# Patient Record
Sex: FEMALE | Race: WHITE | NOT HISPANIC OR LATINO | Employment: UNEMPLOYED | URBAN - METROPOLITAN AREA
[De-identification: names, ages, dates, MRNs, and addresses within clinical notes are randomized per-mention and may not be internally consistent; named-entity substitution may affect disease eponyms.]

---

## 2022-11-20 ENCOUNTER — APPOINTMENT (OUTPATIENT)
Dept: RADIOLOGY | Facility: MEDICAL CENTER | Age: 52
DRG: 637 | End: 2022-11-20
Attending: EMERGENCY MEDICINE
Payer: COMMERCIAL

## 2022-11-20 ENCOUNTER — APPOINTMENT (OUTPATIENT)
Dept: CARDIOLOGY | Facility: MEDICAL CENTER | Age: 52
DRG: 637 | End: 2022-11-20
Payer: COMMERCIAL

## 2022-11-20 ENCOUNTER — HOSPITAL ENCOUNTER (INPATIENT)
Facility: MEDICAL CENTER | Age: 52
LOS: 2 days | DRG: 637 | End: 2022-11-22
Attending: EMERGENCY MEDICINE | Admitting: STUDENT IN AN ORGANIZED HEALTH CARE EDUCATION/TRAINING PROGRAM
Payer: COMMERCIAL

## 2022-11-20 DIAGNOSIS — R41.82 ALTERED MENTAL STATUS, UNSPECIFIED ALTERED MENTAL STATUS TYPE: ICD-10-CM

## 2022-11-20 DIAGNOSIS — J96.01 ACUTE HYPOXEMIC RESPIRATORY FAILURE (HCC): ICD-10-CM

## 2022-11-20 DIAGNOSIS — R79.89 ELEVATED TROPONIN: ICD-10-CM

## 2022-11-20 DIAGNOSIS — R09.02 HYPOXIA: ICD-10-CM

## 2022-11-20 DIAGNOSIS — N17.9 AKI (ACUTE KIDNEY INJURY) (HCC): ICD-10-CM

## 2022-11-20 DIAGNOSIS — R00.0 TACHYCARDIA: ICD-10-CM

## 2022-11-20 PROBLEM — G93.40 ENCEPHALOPATHY: Status: ACTIVE | Noted: 2022-11-20

## 2022-11-20 PROBLEM — H54.8 LEGALLY BLIND: Chronic | Status: ACTIVE | Noted: 2022-11-20

## 2022-11-20 PROBLEM — Z79.4 TYPE 2 DIABETES MELLITUS WITH CHRONIC KIDNEY DISEASE, WITH LONG-TERM CURRENT USE OF INSULIN (HCC): Chronic | Status: ACTIVE | Noted: 2022-11-20

## 2022-11-20 PROBLEM — R74.01 TRANSAMINITIS: Status: ACTIVE | Noted: 2022-11-20

## 2022-11-20 PROBLEM — N18.9 CKD (CHRONIC KIDNEY DISEASE): Status: ACTIVE | Noted: 2022-11-20

## 2022-11-20 PROBLEM — E10.9 TYPE 1 DIABETES MELLITUS (HCC): Chronic | Status: ACTIVE | Noted: 2022-11-20

## 2022-11-20 PROBLEM — E11.22 TYPE 2 DIABETES MELLITUS WITH CHRONIC KIDNEY DISEASE, WITH LONG-TERM CURRENT USE OF INSULIN (HCC): Chronic | Status: ACTIVE | Noted: 2022-11-20

## 2022-11-20 PROBLEM — J96.00 ACUTE RESPIRATORY FAILURE (HCC): Status: ACTIVE | Noted: 2022-11-20

## 2022-11-20 PROBLEM — N18.9 CKD (CHRONIC KIDNEY DISEASE): Chronic | Status: ACTIVE | Noted: 2022-11-20

## 2022-11-20 LAB
ALBUMIN SERPL BCP-MCNC: 4.3 G/DL (ref 3.2–4.9)
ALBUMIN/GLOB SERPL: 1 G/DL
ALP SERPL-CCNC: 150 U/L (ref 30–99)
ALT SERPL-CCNC: 67 U/L (ref 2–50)
AMPHET UR QL SCN: NEGATIVE
ANION GAP SERPL CALC-SCNC: 12 MMOL/L (ref 7–16)
APPEARANCE UR: CLEAR
AST SERPL-CCNC: 95 U/L (ref 12–45)
B-OH-BUTYR SERPL-MCNC: 0.4 MMOL/L (ref 0.02–0.27)
BARBITURATES UR QL SCN: POSITIVE
BASE EXCESS BLDA CALC-SCNC: 0 MMOL/L (ref -4–3)
BASOPHILS # BLD AUTO: 0.5 % (ref 0–1.8)
BASOPHILS # BLD: 0.07 K/UL (ref 0–0.12)
BENZODIAZ UR QL SCN: NEGATIVE
BILIRUB SERPL-MCNC: 0.4 MG/DL (ref 0.1–1.5)
BILIRUB UR QL STRIP.AUTO: NEGATIVE
BODY TEMPERATURE: 37.1 CENTIGRADE
BUN SERPL-MCNC: 30 MG/DL (ref 8–22)
BZE UR QL SCN: NEGATIVE
CALCIUM SERPL-MCNC: 9.4 MG/DL (ref 8.5–10.5)
CANNABINOIDS UR QL SCN: NEGATIVE
CHLORIDE SERPL-SCNC: 94 MMOL/L (ref 96–112)
CK SERPL-CCNC: 184 U/L (ref 0–154)
CO2 SERPL-SCNC: 27 MMOL/L (ref 20–33)
COLOR UR: YELLOW
CREAT SERPL-MCNC: 1.55 MG/DL (ref 0.5–1.4)
D DIMER PPP IA.FEU-MCNC: 0.32 UG/ML (FEU) (ref 0–0.5)
EKG IMPRESSION: NORMAL
EOSINOPHIL # BLD AUTO: 0.01 K/UL (ref 0–0.51)
EOSINOPHIL NFR BLD: 0.1 % (ref 0–6.9)
ERYTHROCYTE [DISTWIDTH] IN BLOOD BY AUTOMATED COUNT: 43.2 FL (ref 35.9–50)
FLUAV RNA SPEC QL NAA+PROBE: NEGATIVE
FLUBV RNA SPEC QL NAA+PROBE: NEGATIVE
GFR SERPLBLD CREATININE-BSD FMLA CKD-EPI: 39 ML/MIN/1.73 M 2
GLOBULIN SER CALC-MCNC: 4.4 G/DL (ref 1.9–3.5)
GLUCOSE BLD STRIP.AUTO-MCNC: 192 MG/DL (ref 65–99)
GLUCOSE BLD STRIP.AUTO-MCNC: 244 MG/DL (ref 65–99)
GLUCOSE BLD STRIP.AUTO-MCNC: 265 MG/DL (ref 65–99)
GLUCOSE BLD STRIP.AUTO-MCNC: 334 MG/DL (ref 65–99)
GLUCOSE SERPL-MCNC: 403 MG/DL (ref 65–99)
GLUCOSE UR STRIP.AUTO-MCNC: >=1000 MG/DL
HCO3 BLDA-SCNC: 25 MMOL/L (ref 17–25)
HCT VFR BLD AUTO: 42.3 % (ref 37–47)
HGB BLD-MCNC: 13.6 G/DL (ref 12–16)
IMM GRANULOCYTES # BLD AUTO: 0.07 K/UL (ref 0–0.11)
IMM GRANULOCYTES NFR BLD AUTO: 0.5 % (ref 0–0.9)
INHALED O2 FLOW RATE: 2 L/MIN (ref 2–10)
KETONES UR STRIP.AUTO-MCNC: ABNORMAL MG/DL
LACTATE SERPL-SCNC: 1.2 MMOL/L (ref 0.5–2)
LACTATE SERPL-SCNC: 1.5 MMOL/L (ref 0.5–2)
LACTATE SERPL-SCNC: 1.8 MMOL/L (ref 0.5–2)
LACTATE SERPL-SCNC: 2.3 MMOL/L (ref 0.5–2)
LEUKOCYTE ESTERASE UR QL STRIP.AUTO: NEGATIVE
LV EJECT FRACT  99904: 55
LYMPHOCYTES # BLD AUTO: 0.82 K/UL (ref 1–4.8)
LYMPHOCYTES NFR BLD: 5.6 % (ref 22–41)
MAGNESIUM SERPL-MCNC: 2 MG/DL (ref 1.5–2.5)
MCH RBC QN AUTO: 29.1 PG (ref 27–33)
MCHC RBC AUTO-ENTMCNC: 32.2 G/DL (ref 33.6–35)
MCV RBC AUTO: 90.4 FL (ref 81.4–97.8)
METHADONE UR QL SCN: NEGATIVE
MICRO URNS: ABNORMAL
MONOCYTES # BLD AUTO: 0.48 K/UL (ref 0–0.85)
MONOCYTES NFR BLD AUTO: 3.3 % (ref 0–13.4)
NEUTROPHILS # BLD AUTO: 13.16 K/UL (ref 2–7.15)
NEUTROPHILS NFR BLD: 90 % (ref 44–72)
NITRITE UR QL STRIP.AUTO: NEGATIVE
NRBC # BLD AUTO: 0 K/UL
NRBC BLD-RTO: 0 /100 WBC
NT-PROBNP SERPL IA-MCNC: 341 PG/ML (ref 0–125)
OPIATES UR QL SCN: NEGATIVE
OXYCODONE UR QL SCN: NEGATIVE
PCO2 BLDA: 40.3 MMHG (ref 26–37)
PCO2 TEMP ADJ BLDA: 40.5 MMHG (ref 26–37)
PCP UR QL SCN: NEGATIVE
PH BLDA: 7.4 [PH] (ref 7.4–7.5)
PH TEMP ADJ BLDA: 7.4 [PH] (ref 7.4–7.5)
PH UR STRIP.AUTO: 5 [PH] (ref 5–8)
PHOSPHATE SERPL-MCNC: 2.8 MG/DL (ref 2.5–4.5)
PLATELET # BLD AUTO: 332 K/UL (ref 164–446)
PMV BLD AUTO: 9.2 FL (ref 9–12.9)
PO2 BLDA: 96 MMHG (ref 64–87)
PO2 TEMP ADJ BLDA: 96.6 MMHG (ref 64–87)
POTASSIUM SERPL-SCNC: 5.3 MMOL/L (ref 3.6–5.5)
PROCALCITONIN SERPL-MCNC: 0.23 NG/ML
PROCALCITONIN SERPL-MCNC: 0.41 NG/ML
PROPOXYPH UR QL SCN: NEGATIVE
PROT SERPL-MCNC: 8.7 G/DL (ref 6–8.2)
PROT UR QL STRIP: NEGATIVE MG/DL
RBC # BLD AUTO: 4.68 M/UL (ref 4.2–5.4)
RBC UR QL AUTO: NEGATIVE
RSV RNA SPEC QL NAA+PROBE: NEGATIVE
SAO2 % BLDA: 96.8 % (ref 93–99)
SARS-COV-2 RNA RESP QL NAA+PROBE: NOTDETECTED
SODIUM SERPL-SCNC: 133 MMOL/L (ref 135–145)
SP GR UR STRIP.AUTO: 1.01
SPECIMEN SOURCE: NORMAL
TROPONIN T SERPL-MCNC: 48 NG/L (ref 6–19)
TROPONIN T SERPL-MCNC: 52 NG/L (ref 6–19)
TROPONIN T SERPL-MCNC: 57 NG/L (ref 6–19)
TROPONIN T SERPL-MCNC: 64 NG/L (ref 6–19)
UROBILINOGEN UR STRIP.AUTO-MCNC: 0.2 MG/DL
WBC # BLD AUTO: 14.6 K/UL (ref 4.8–10.8)

## 2022-11-20 PROCEDURE — 85379 FIBRIN DEGRADATION QUANT: CPT

## 2022-11-20 PROCEDURE — 80053 COMPREHEN METABOLIC PANEL: CPT

## 2022-11-20 PROCEDURE — A9270 NON-COVERED ITEM OR SERVICE: HCPCS | Performed by: EMERGENCY MEDICINE

## 2022-11-20 PROCEDURE — 71045 X-RAY EXAM CHEST 1 VIEW: CPT

## 2022-11-20 PROCEDURE — 51798 US URINE CAPACITY MEASURE: CPT

## 2022-11-20 PROCEDURE — 99223 1ST HOSP IP/OBS HIGH 75: CPT | Mod: AI | Performed by: STUDENT IN AN ORGANIZED HEALTH CARE EDUCATION/TRAINING PROGRAM

## 2022-11-20 PROCEDURE — C9803 HOPD COVID-19 SPEC COLLECT: HCPCS

## 2022-11-20 PROCEDURE — 36415 COLL VENOUS BLD VENIPUNCTURE: CPT

## 2022-11-20 PROCEDURE — 93306 TTE W/DOPPLER COMPLETE: CPT

## 2022-11-20 PROCEDURE — 84484 ASSAY OF TROPONIN QUANT: CPT | Mod: 91

## 2022-11-20 PROCEDURE — 94760 N-INVAS EAR/PLS OXIMETRY 1: CPT

## 2022-11-20 PROCEDURE — 85025 COMPLETE CBC W/AUTO DIFF WBC: CPT

## 2022-11-20 PROCEDURE — 83880 ASSAY OF NATRIURETIC PEPTIDE: CPT

## 2022-11-20 PROCEDURE — 83605 ASSAY OF LACTIC ACID: CPT | Mod: 91

## 2022-11-20 PROCEDURE — 93005 ELECTROCARDIOGRAM TRACING: CPT

## 2022-11-20 PROCEDURE — 770020 HCHG ROOM/CARE - TELE (206)

## 2022-11-20 PROCEDURE — 700105 HCHG RX REV CODE 258: Performed by: EMERGENCY MEDICINE

## 2022-11-20 PROCEDURE — 700102 HCHG RX REV CODE 250 W/ 637 OVERRIDE(OP)

## 2022-11-20 PROCEDURE — 83735 ASSAY OF MAGNESIUM: CPT

## 2022-11-20 PROCEDURE — 82803 BLOOD GASES ANY COMBINATION: CPT

## 2022-11-20 PROCEDURE — 700105 HCHG RX REV CODE 258: Performed by: INTERNAL MEDICINE

## 2022-11-20 PROCEDURE — 700117 HCHG RX CONTRAST REV CODE 255

## 2022-11-20 PROCEDURE — 82550 ASSAY OF CK (CPK): CPT

## 2022-11-20 PROCEDURE — 99285 EMERGENCY DEPT VISIT HI MDM: CPT

## 2022-11-20 PROCEDURE — 82962 GLUCOSE BLOOD TEST: CPT | Mod: 91

## 2022-11-20 PROCEDURE — 84100 ASSAY OF PHOSPHORUS: CPT

## 2022-11-20 PROCEDURE — 82010 KETONE BODYS QUAN: CPT

## 2022-11-20 PROCEDURE — 81003 URINALYSIS AUTO W/O SCOPE: CPT

## 2022-11-20 PROCEDURE — 700102 HCHG RX REV CODE 250 W/ 637 OVERRIDE(OP): Performed by: EMERGENCY MEDICINE

## 2022-11-20 PROCEDURE — 93306 TTE W/DOPPLER COMPLETE: CPT | Mod: 26 | Performed by: INTERNAL MEDICINE

## 2022-11-20 PROCEDURE — A9270 NON-COVERED ITEM OR SERVICE: HCPCS

## 2022-11-20 PROCEDURE — 0241U HCHG SARS-COV-2 COVID-19 NFCT DS RESP RNA 4 TRGT MIC: CPT

## 2022-11-20 PROCEDURE — 700111 HCHG RX REV CODE 636 W/ 250 OVERRIDE (IP)

## 2022-11-20 PROCEDURE — 93005 ELECTROCARDIOGRAM TRACING: CPT | Performed by: EMERGENCY MEDICINE

## 2022-11-20 PROCEDURE — 96372 THER/PROPH/DIAG INJ SC/IM: CPT

## 2022-11-20 PROCEDURE — 84145 PROCALCITONIN (PCT): CPT | Mod: 91

## 2022-11-20 PROCEDURE — 80307 DRUG TEST PRSMV CHEM ANLYZR: CPT

## 2022-11-20 RX ORDER — SODIUM CHLORIDE, SODIUM LACTATE, POTASSIUM CHLORIDE, CALCIUM CHLORIDE 600; 310; 30; 20 MG/100ML; MG/100ML; MG/100ML; MG/100ML
1000 INJECTION, SOLUTION INTRAVENOUS ONCE
Status: COMPLETED | OUTPATIENT
Start: 2022-11-20 | End: 2022-11-20

## 2022-11-20 RX ORDER — PANTOPRAZOLE SODIUM 40 MG/1
40 TABLET, DELAYED RELEASE ORAL DAILY
Status: DISCONTINUED | OUTPATIENT
Start: 2022-11-20 | End: 2022-11-20

## 2022-11-20 RX ORDER — POLYETHYLENE GLYCOL 3350 17 G/17G
1 POWDER, FOR SOLUTION ORAL
Status: DISCONTINUED | OUTPATIENT
Start: 2022-11-20 | End: 2022-11-22 | Stop reason: HOSPADM

## 2022-11-20 RX ORDER — ACETAMINOPHEN 325 MG/1
650 TABLET ORAL EVERY 6 HOURS PRN
Status: DISCONTINUED | OUTPATIENT
Start: 2022-11-20 | End: 2022-11-22 | Stop reason: HOSPADM

## 2022-11-20 RX ORDER — OMEPRAZOLE 20 MG/1
20 CAPSULE, DELAYED RELEASE ORAL DAILY
Status: DISCONTINUED | OUTPATIENT
Start: 2022-11-20 | End: 2022-11-22 | Stop reason: HOSPADM

## 2022-11-20 RX ORDER — INSULIN ASPART 100 [IU]/ML
INJECTION, SOLUTION INTRAVENOUS; SUBCUTANEOUS
COMMUNITY

## 2022-11-20 RX ORDER — INSULIN GLARGINE 100 [IU]/ML
24 INJECTION, SOLUTION SUBCUTANEOUS EVERY MORNING
COMMUNITY

## 2022-11-20 RX ORDER — IPRATROPIUM BROMIDE AND ALBUTEROL SULFATE 2.5; .5 MG/3ML; MG/3ML
3 SOLUTION RESPIRATORY (INHALATION)
Status: DISCONTINUED | OUTPATIENT
Start: 2022-11-20 | End: 2022-11-22 | Stop reason: HOSPADM

## 2022-11-20 RX ORDER — BISACODYL 10 MG
10 SUPPOSITORY, RECTAL RECTAL
Status: DISCONTINUED | OUTPATIENT
Start: 2022-11-20 | End: 2022-11-22 | Stop reason: HOSPADM

## 2022-11-20 RX ORDER — AMOXICILLIN 250 MG
2 CAPSULE ORAL 2 TIMES DAILY
Status: DISCONTINUED | OUTPATIENT
Start: 2022-11-20 | End: 2022-11-22 | Stop reason: HOSPADM

## 2022-11-20 RX ORDER — GABAPENTIN 600 MG/1
1 TABLET ORAL 2 TIMES DAILY
Status: ON HOLD | COMMUNITY
Start: 2022-09-08 | End: 2022-11-22 | Stop reason: SDUPTHER

## 2022-11-20 RX ORDER — GABAPENTIN 300 MG/1
600 CAPSULE ORAL 2 TIMES DAILY
Status: DISCONTINUED | OUTPATIENT
Start: 2022-11-20 | End: 2022-11-22 | Stop reason: HOSPADM

## 2022-11-20 RX ORDER — SODIUM CHLORIDE 9 MG/ML
INJECTION, SOLUTION INTRAVENOUS CONTINUOUS
Status: ACTIVE | OUTPATIENT
Start: 2022-11-20 | End: 2022-11-21

## 2022-11-20 RX ORDER — PANTOPRAZOLE SODIUM 40 MG/1
40 TABLET, DELAYED RELEASE ORAL DAILY
COMMUNITY
Start: 2022-08-16

## 2022-11-20 RX ORDER — ASPIRIN 325 MG
325 TABLET ORAL ONCE
Status: COMPLETED | OUTPATIENT
Start: 2022-11-20 | End: 2022-11-20

## 2022-11-20 RX ORDER — HEPARIN SODIUM 5000 [USP'U]/ML
5000 INJECTION, SOLUTION INTRAVENOUS; SUBCUTANEOUS EVERY 8 HOURS
Status: DISCONTINUED | OUTPATIENT
Start: 2022-11-20 | End: 2022-11-22 | Stop reason: HOSPADM

## 2022-11-20 RX ADMIN — SODIUM CHLORIDE, POTASSIUM CHLORIDE, SODIUM LACTATE AND CALCIUM CHLORIDE 1000 ML: 600; 310; 30; 20 INJECTION, SOLUTION INTRAVENOUS at 05:18

## 2022-11-20 RX ADMIN — OMEPRAZOLE 20 MG: 20 CAPSULE, DELAYED RELEASE ORAL at 08:27

## 2022-11-20 RX ADMIN — SODIUM CHLORIDE: 9 INJECTION, SOLUTION INTRAVENOUS at 23:10

## 2022-11-20 RX ADMIN — INSULIN HUMAN 2 UNITS: 100 INJECTION, SOLUTION PARENTERAL at 17:19

## 2022-11-20 RX ADMIN — INSULIN HUMAN 1 UNITS: 100 INJECTION, SOLUTION PARENTERAL at 21:46

## 2022-11-20 RX ADMIN — INSULIN GLARGINE-YFGN 15 UNITS: 100 INJECTION, SOLUTION SUBCUTANEOUS at 10:11

## 2022-11-20 RX ADMIN — SODIUM CHLORIDE: 9 INJECTION, SOLUTION INTRAVENOUS at 08:59

## 2022-11-20 RX ADMIN — HEPARIN SODIUM 5000 UNITS: 5000 INJECTION, SOLUTION INTRAVENOUS; SUBCUTANEOUS at 08:26

## 2022-11-20 RX ADMIN — ASPIRIN 325 MG: 325 TABLET ORAL at 04:43

## 2022-11-20 RX ADMIN — HEPARIN SODIUM 5000 UNITS: 5000 INJECTION, SOLUTION INTRAVENOUS; SUBCUTANEOUS at 16:15

## 2022-11-20 RX ADMIN — HUMAN ALBUMIN MICROSPHERES AND PERFLUTREN 3 ML: 10; .22 INJECTION, SOLUTION INTRAVENOUS at 08:15

## 2022-11-20 RX ADMIN — HEPARIN SODIUM 5000 UNITS: 5000 INJECTION, SOLUTION INTRAVENOUS; SUBCUTANEOUS at 23:00

## 2022-11-20 RX ADMIN — ACETAMINOPHEN 650 MG: 325 TABLET, FILM COATED ORAL at 08:27

## 2022-11-20 RX ADMIN — INSULIN HUMAN 3 UNITS: 100 INJECTION, SOLUTION PARENTERAL at 10:10

## 2022-11-20 ASSESSMENT — COGNITIVE AND FUNCTIONAL STATUS - GENERAL
MOVING TO AND FROM BED TO CHAIR: A LITTLE
STANDING UP FROM CHAIR USING ARMS: A LITTLE
WALKING IN HOSPITAL ROOM: A LOT
MOBILITY SCORE: 20
HELP NEEDED FOR BATHING: A LITTLE
DAILY ACTIVITIY SCORE: 23
SUGGESTED CMS G CODE MODIFIER MOBILITY: CJ
SUGGESTED CMS G CODE MODIFIER DAILY ACTIVITY: CI

## 2022-11-20 ASSESSMENT — PATIENT HEALTH QUESTIONNAIRE - PHQ9
1. LITTLE INTEREST OR PLEASURE IN DOING THINGS: NOT AT ALL
SUM OF ALL RESPONSES TO PHQ9 QUESTIONS 1 AND 2: 0
SUM OF ALL RESPONSES TO PHQ9 QUESTIONS 1 AND 2: 0
2. FEELING DOWN, DEPRESSED, IRRITABLE, OR HOPELESS: NOT AT ALL
1. LITTLE INTEREST OR PLEASURE IN DOING THINGS: NOT AT ALL
2. FEELING DOWN, DEPRESSED, IRRITABLE, OR HOPELESS: NOT AT ALL

## 2022-11-20 ASSESSMENT — LIFESTYLE VARIABLES
ALCOHOL_USE: NO
TOTAL SCORE: 0
TOTAL SCORE: 0
EVER HAD A DRINK FIRST THING IN THE MORNING TO STEADY YOUR NERVES TO GET RID OF A HANGOVER: NO
EVER FELT BAD OR GUILTY ABOUT YOUR DRINKING: NO
CONSUMPTION TOTAL: NEGATIVE
ON A TYPICAL DAY WHEN YOU DRINK ALCOHOL HOW MANY DRINKS DO YOU HAVE: 0
DOES PATIENT WANT TO STOP DRINKING: NO
HOW MANY TIMES IN THE PAST YEAR HAVE YOU HAD 5 OR MORE DRINKS IN A DAY: 0
HAVE PEOPLE ANNOYED YOU BY CRITICIZING YOUR DRINKING: NO
HAVE YOU EVER FELT YOU SHOULD CUT DOWN ON YOUR DRINKING: NO
TOTAL SCORE: 0
AVERAGE NUMBER OF DAYS PER WEEK YOU HAVE A DRINK CONTAINING ALCOHOL: 0
SUBSTANCE_ABUSE: 0

## 2022-11-20 ASSESSMENT — ENCOUNTER SYMPTOMS
CHILLS: 0
CLAUDICATION: 0
NAUSEA: 0
EYE PAIN: 0
BACK PAIN: 0
PALPITATIONS: 0
SPUTUM PRODUCTION: 0
SHORTNESS OF BREATH: 0
DIZZINESS: 0
COUGH: 0
MYALGIAS: 1
VOMITING: 0
SINUS PAIN: 0
PHOTOPHOBIA: 0
SORE THROAT: 0
MYALGIAS: 0
WEAKNESS: 0
HEADACHES: 0
BRUISES/BLEEDS EASILY: 0
EYE DISCHARGE: 0
FOCAL WEAKNESS: 0
ABDOMINAL PAIN: 0
WHEEZING: 0
BLOOD IN STOOL: 0
FEVER: 0
HALLUCINATIONS: 0
NECK PAIN: 0
DEPRESSION: 0

## 2022-11-20 ASSESSMENT — FIBROSIS 4 INDEX: FIB4 SCORE: 1.89

## 2022-11-20 NOTE — ASSESSMENT & PLAN NOTE
ALT 67, AST 95, alk phos 150 on admission  No abdominal pain, nausea, or vomiting  -Avoid hepatoxins, serial monitor liver and synthetic function   -Monitor for hepatic encephalopathy

## 2022-11-20 NOTE — ED PROVIDER NOTES
"ED Provider Note    Scribed for Ron Rivers II, M* by Orestes Deluca. 11/20/2022  2:11 AM    Means of Arrival: EMS [facility]  History obtained by: Patient/EMS  Limitations: None noted    CHIEF COMPLAINT  Chief Complaint   Patient presents with    ALOC       HPI  Brenda Mckeon is a 51 y.o. female with a history of type 1 diabetes, a heart murmur, and legal blindness who presents to the Emergency Department via EMS for evaluation of altered level of consciousness onset earlier today. Per  EMS, the patient was a passenger in a car driving through [facility] when her family noted she was acting abnormally. She lives in [place], and notes that she was driving to [place] for a family reunion starting yesterday morning. She states she also has left leg cramping pain, and increased drowsiness, but denies any vomiting or chest pain.  Per EMS her blood glucose on scene was 407, but was 334 in the Emergency Department. She was reportedly saturating at 80% on room air, and was placed on oxygen en route. She has a history of open heart surgery in 1993 for placement of \"a special patch\" due to chronically low oxygen saturation of 80%. She does not typically use supplemental oxygen. She denies any history of blood clots, asthma, CHF, or COPD, and notes that she does not take any anticoagulating medications. Her daily medications include an unnamed diuretic and insulin. She is legally blind due to complications of diabetes. She has no chest pain on inspiration.     REVIEW OF SYSTEMS  Review of Systems   Constitutional:  Positive for malaise/fatigue.   Cardiovascular:  Negative for chest pain.   Gastrointestinal:  Negative for vomiting.   Musculoskeletal:  Positive for myalgias.   All other systems reviewed and are negative.  See HPI for further details.    PAST MEDICAL HISTORY   has a past medical history of Diabetes ([facility]), Heart murmur, and Renal disorder.Diabetes  Blind 2/2 diabetes complications  Neuropathy    SOCIAL " "HISTORY  Social History     Tobacco Use    Smoking status: Not noted      SURGICAL HISTORY   has a past surgical history that includes other abdominal surgery and other cardiac surgery.    CURRENT MEDICATIONS  Home Medications       Reviewed by Goivanny Trammell (Pharmacy Tech) on 11/20/22 at 0411  Med List Status: Complete     Medication Last Dose Status   Calcium Carbonate Antacid (ANTACID PO) 11/19/2022 Active   GABAPENTIN PO 11/19/2022 Active   insulin aspart (NOVOLOG) 100 UNIT/ML Solution 11/19/2022 Active   insulin glargine 100 UNIT/ML SC SOLN 11/18/2022 Active                    ALLERGIES  Allergies   Allergen Reactions    Humalog [Insulin Lispro, Human] Unspecified     Unknown    Valium Unspecified     Unknown       PHYSICAL EXAM  VITAL SIGNS: Pulse (!) (P) 110   Temp (P) 36.4 °C (97.6 °F) (Tympanic)   Resp (P) 18   Ht (P) 1.626 m (5' 4\")   Wt (P) 72.6 kg (160 lb)   SpO2 (P) 98%   BMI (P) 27.46 kg/m²     Pulse ox interpretation: I interpret this pulse ox as abnormal.  Constitutional: Alert in no apparent distress. Pleasant 51 year old female laying in bed.  HENT: No signs of trauma, Bilateral external ears normal, Nose normal.   Eyes: Pupils are equal, Conjunctiva normal, Non-icteric. Patient is blind.  Neck: Normal range of motion, No tenderness, Supple, No stridor.   Cardiovascular: Regular rate and rhythm, no murmurs. Symmetric distal pulses. No cyanosis of extremities. Strong pedal pulses noted to left leg. Trace pitting edema to the left lower extremity.  Thorax & Lungs: Normal breath sounds, No respiratory distress, No wheezing, No chest tenderness.   Abdomen: Soft, No tenderness, No masses, No pulsatile masses. No peritoneal signs.  Skin: Warm, Dry, No erythema, No rash.   Back: No midline bony tenderness, No CVA tenderness.   Musculoskeletal: Good range of motion in all major joints. No tenderness to palpation or major deformities noted.   Neurologic: Alert , Normal motor function, Normal " sensory function, No focal deficits noted. Mentation is good, alert and oriented to person time place and situation. No facial droop, Moves all extremities. No unilateral weakness or deficits.  Psychiatric: Affect normal, Judgment normal, Mood normal.     DIAGNOSTIC STUDIES / PROCEDURES    EKG Interpretation:  Results for orders placed or performed during the hospital encounter of 22   EKG   Result Value Ref Range    Report       [facility] Emergency Dept.    Test Date:  2022  Pt Name:    JANELLE SMITH                   Department: ER  MRN:        [id number]                      Room:       RD 10  Gender:     F                            Technician: [id number]  :        1970                   Requested By:ER TRIAGE PROTOCOL  Order #:    [id number]                    Reading MD: Ron Rivers II, MD    Measurements  Intervals                                Axis  Rate:       111                          P:          0  AR:         144                          QRS:        88  QRSD:       127                          T:          -79  QT:         384  QTc:        522    Interpretive Statements  Sinus tachycardia  Right atrial enlargement  Right bundle branch block  Twave inversions in lateral and septal leads. No ST elevation.  Impression: SInus tachycardia with RBBB and lateral twave inversions,  possible  ischemia  No previous ECG available for comparison  Electronically Signed On  4:24:00 PST by Ron Rivers II, MD        LABS  Pertinent Labs & Imaging studies reviewed. (See chart for details)    RADIOLOGY  DX-CHEST-PORTABLE (1 VIEW)   Final Result      Mild cardiac silhouette enlargement following sternotomy      EC-ECHOCARDIOGRAM COMPLETE W/O CONT    (Results Pending)     Pertinent Labs & Imaging studies reviewed. (See chart for details)    COURSE & MEDICAL DECISION MAKING  Pertinent Labs & Imaging studies reviewed. (See chart for details)    2:11 AM This is a 51 y.o.  female with type 1 diabetes who presents with altered level of consciousness(no longer altered), hypoxia, and hyperglycemia and the differential diagnosis includes but is not limited to DKA, DVT, PE, MI, Renal failure, or Pneumonia. Ordered for DX-Chest, D-Dimer, Beta-Hydroxybutyric acid, CBC w/diff, CMP, Lactic Acid, Troponin, proBNP, UA, and EKG to evaluate.    I have ordered continuous pulse ox and cardiac monitoring. There are abnormalities. Pulse ox shows a normal waveform with saturations of 85% on room air. Cardiac monitors show a heart rate of 110 with a sinus rhythm.      4:24 AM - The patient will be medicated with Aspirin 325 mg PO.    4:39 AM - Paged Hospitalist. Ordered Troponin.    5:01 AM - Patient was reevaluated at bedside. Patient's daughter and caregiver at bedside. Discussed lab and radiology results with the patient and daughter and informed her that she has a low eGFR and elevated troponin. She spoke with a nephrologist in May of this year due to chronic kidney disease with a baseline creatinine of 1 with fluctuations based on volume status. Her creatinine was 0.98 on 4/22/22. She has not needed any follow up since. She was hospitalized in June of this year. Patient's daughter indicates that the patient felt near-syncopal and was having night terrors in her sleep, and was unable to be roused. She regularly takes gabapentin, an acid reflux medication, and insulin daily. Ordered for incentive spirometry to evaluate continued hypoxia as well as an ABG. Patient indicates she does not want to stay in the hospital. Explained that my main concern is prolonged hypoxia, and we would like to see the results after doing incentive spirometry.    5:10 AM - The patient will be medicated with LR bolus for her increased creatinine of 1.55 compare to baseline of 0.8 and hyperglycemia.  Ordered for Arterial Blood Gas w/ O2.    5:19 AM - I discussed the patient's case and the above findings with Niya Celestin  ([facility] Hospitalist) who agrees to evaluate the patient for hospitalization.     5:26 AM - Patient was reevaluated at bedside. Explained the need to obtain an ABG (to see what o2 level actually is, rule out pulse oximeter error) and the plan to have her discuss further treatment with a Hospitalist. Patient verbalizes understanding and agreement to this plan of care.      6:14 AM  - I discussed the patient's case and the above findings with Dr. Ford (Hospitalist) who notes that the patient is refusing admission at this time.        DISPOSITION:  Patient will be hospitalized by Niya Celestin ([facility] Hospitalist) in guarded condition.     FINAL IMPRESSION  1. Altered mental status, unspecified altered mental status type    2. Hypoxia    3. Tachycardia    4. RODY (acute kidney injury) (HCC)    5. Elevated troponin          I, Orestes Deluca (Scribe), am scribing for, and in the presence of, ESME Kenny II.    Electronically signed by: Orestes Deluca (Scribe), 11/20/2022    IRon II, M* personally performed the services described in this documentation, as scribed by Orestes Deluca in my presence, and it is both accurate and complete.    The note accurately reflects work and decisions made by me.  Ron Rivers II, M.D.  11/20/2022  7:40 AM

## 2022-11-20 NOTE — DISCHARGE PLANNING
RNCM notified that pt belongings were left on [facility]; RNCM PC to inquire. [facility] to drop off belongings.

## 2022-11-20 NOTE — ED NOTES
Pt resting comfortably in bed at this time. On vitals monitor. Respirations equal and unlabored. Vitals signs stable. No acute distress at this time. Call light within reach.

## 2022-11-20 NOTE — ASSESSMENT & PLAN NOTE
Troponin 52 and 57 on admission  Patient denies chest pain  Suspect secondary to demand due to hypoxia  EKG on admission demonstrates sinus tachycardia, rate 111, with right bundle branch block and T wave inversion in lateral leads  -Aspirin 324x1  -Continue to trend troponin every 6 hours  -Cardiac echo ordered   -Telemetry monitoring

## 2022-11-20 NOTE — ED NOTES
Rounded on pt. Pt resting comfortably in bed at this time. On vitals monitor. Respirations equal and unlabored. Vitals signs stable. No acute distress at this time. Call light within reach.

## 2022-11-20 NOTE — ED NOTES
"Pt hesitant about being admitted to hospital. States \"My family is in [place], and I cannot stay here\". Hospitalist at bedside with myself and ARACELI Mcclellan. Risks and benefits explained to the patient. Explained to patient that she is requiring oxygen to maintain good saturation levels, and without it, or identifying the cause for the low oxygen, she would rapidly deteriorate. Patient remains addiment about not being admitted. Daughter at bedside, expressed frustration about not receiving answers for what is going on. Hospitalist explained to patient and daughter the processes of the hospital, and all we have investigated with the patient.   "

## 2022-11-20 NOTE — ED NOTES
Patient transported with ACLS tech on tele monitor. All patient belongings, meds, and chart sent with patient. Patient care transferred.

## 2022-11-20 NOTE — ED NOTES
Medicated patient per MAR. Patient denies further needs. Call light within reach. Family at bedside.

## 2022-11-20 NOTE — ED TRIAGE NOTES
Chief Complaint   Patient presents with   • ALOC     Pt [facility]. Pt was driving with family, when they noticed she became altered and drowsy. Pt ANOx4 to R10. Changed into hospital gown and on monitor at this time. Glucose of 407 by EMS

## 2022-11-20 NOTE — ED NOTES
Patient agreeable with POC at this time. Echo at bedside. Phlebotomy requested to draw blood cultures.

## 2022-11-20 NOTE — ASSESSMENT & PLAN NOTE
Secondary to uncontrolled diabetes  Patient reports baseline creatinine is approximately 1  -Avoid nephrotoxic drugs

## 2022-11-20 NOTE — H&P
Hospital Medicine History & Physical Note    Date of Service  11/20/2022    Primary Care Physician  Pcp Not In Computer    Consultants  None    Code Status  DNR/DNI (confirmed on admission)    Chief Complaint  Chief Complaint   Patient presents with    ALOC       History of Presenting Illness  Brenda Mckeon is a 52 y.o. female with uncontrolled type 2 diabetes, CKD3a, legally blind 2/2 uncontrolled DM2, and obesity, s/p cholecystectomy, s/p open heart surgery as a child, who presented the hospital on 11/20/2022 via life flight from [place], [place] with altered mental status and hypoxia.    History was obtained from the patient and the patient's daughter.  Patient's daughter reports that they were on a long road trip from [place] to [place] when the patient became unresponsive in the backseat of the car prompting family to call 911.  Upon arrival of EMS, the patient was found to be hypoxic with SPO2 of 65% on room air.  The patient was transported via [facility] to [facility].  Patient denies chest pain, shortness of breath, cough, fever, palpitations, nausea or vomiting, or dysuria.  She denies recent sick contacts or recent illness.  She states that she was in her normal state of health prior to being transported to the hospital.     Work-up in the ER was significant for blood glucose of 400 with normal anion gap, bicarb of 27, and mildly elevated beta hydroxybutyrate of 0.4.  Other pertinent labs include leukocytosis of 14.6, BUN of 38 and creatinine of 1.55, AST 95, ALT 67, elevated alk phos of 150 and mildly elevated lactic acid 2.3.  Troponin elevated at 52 and 57.  D-dimer negative.  Urinalysis with elevated glucose and trace ketones.  UDS positive for barbiturates.     I discussed the plan of care with patient, family, bedside RN.     Review of Systems  Review of Systems   Constitutional:  Negative for chills, fever and malaise/fatigue.   HENT:  Negative for congestion, nosebleeds, sinus pain and sore throat.    Eyes:   Negative for photophobia, pain and discharge.        Legally blind   Respiratory:  Negative for cough, sputum production, shortness of breath and wheezing.    Cardiovascular:  Negative for chest pain, palpitations, claudication and leg swelling.   Gastrointestinal:  Negative for abdominal pain, blood in stool, nausea and vomiting.   Genitourinary:  Negative for dysuria, frequency and urgency.   Musculoskeletal:  Negative for back pain, myalgias and neck pain.   Skin:  Negative for itching and rash.   Neurological:  Negative for dizziness, focal weakness, weakness and headaches.   Endo/Heme/Allergies:  Negative for environmental allergies. Does not bruise/bleed easily.   Psychiatric/Behavioral:  Negative for depression, hallucinations, substance abuse and suicidal ideas.         Night terrors      Past Medical History   has no past medical history on file.    Surgical History   has no past surgical history on file.     Family History  family history is not on file.   Family history reviewed with patient. There is no family history that is pertinent to the chief complaint.     Social History       Allergies  Allergies   Allergen Reactions    Humalog [Insulin Lispro, Human] Unspecified     Unknown    Valium Unspecified     Unknown       Medications  Prior to Admission Medications   Prescriptions Last Dose Informant Patient Reported? Taking?   gabapentin (NEURONTIN) 600 MG tablet 11/19/2022 at pm Patient Yes Yes   Sig: Take 1 Tablet by mouth 2 times a day.   insulin aspart (NOVOLOG) 100 UNIT/ML Solution 11/19/2022 at PM Patient Yes Yes   Sig: Inject  under the skin 3 times a day before meals. PER SLIDING SCALE   insulin glargine 100 UNIT/ML SC SOLN 11/18/2022 at AM Patient Yes Yes   Sig: Inject 24 Units under the skin every morning.   pantoprazole (PROTONIX) 40 MG Tablet Delayed Response 11/19/2022 at am Patient Yes Yes   Sig: Take 1 Tablet by mouth every day.      Facility-Administered Medications: None       Physical  Exam  Temp:  [36.4 °C (97.6 °F)] 36.4 °C (97.6 °F)  Pulse:  [] 99  Resp:  [14-20] 18  BP: (101-176)/(53-77) 101/53  SpO2:  [94 %-99 %] 97 %  Blood Pressure: (P) 134/64   Temperature: 36.4 °C (97.6 °F)   Pulse: (P) 99   Respiration: (P) 19   Pulse Oximetry: (P) 96 %       Physical Exam  Constitutional:       General: She is not in acute distress.     Appearance: She is obese. She is not ill-appearing, toxic-appearing or diaphoretic.   HENT:      Mouth/Throat:      Mouth: Mucous membranes are dry.   Eyes:      Comments: Legally blind   Cardiovascular:      Rate and Rhythm: Normal rate and regular rhythm.      Pulses: Normal pulses.      Heart sounds: Murmur heard.   Pulmonary:      Effort: Pulmonary effort is normal. No respiratory distress.      Breath sounds: No stridor. No wheezing, rhonchi or rales.   Abdominal:      General: Abdomen is flat. There is no distension.      Palpations: Abdomen is soft.      Tenderness: There is no abdominal tenderness. There is no guarding or rebound.      Hernia: No hernia is present.   Musculoskeletal:      Cervical back: Neck supple.      Right lower leg: No edema.      Left lower leg: No edema.   Skin:     General: Skin is warm and dry.      Capillary Refill: Capillary refill takes less than 2 seconds.   Neurological:      General: No focal deficit present.      Mental Status: She is alert and oriented to person, place, and time. Mental status is at baseline.      Cranial Nerves: No cranial nerve deficit.      Sensory: No sensory deficit.      Motor: No weakness.   Psychiatric:         Mood and Affect: Mood normal.       Laboratory:  Recent Labs     11/20/22 0204   WBC 14.6*   RBC 4.68   HEMOGLOBIN 13.6   HEMATOCRIT 42.3   MCV 90.4   MCH 29.1   MCHC 32.2*   RDW 43.2   PLATELETCT 332   MPV 9.2     Recent Labs     11/20/22 0204   SODIUM 133*   POTASSIUM 5.3   CHLORIDE 94*   CO2 27   GLUCOSE 403*   BUN 30*   CREATININE 1.55*   CALCIUM 9.4     Recent Labs     11/20/22 0204    ALTSGPT 67*   ASTSGOT 95*   ALKPHOSPHAT 150*   TBILIRUBIN 0.4   GLUCOSE 403*         Recent Labs     11/20/22  0204   NTPROBNP 341*         Recent Labs     11/20/22  0204 11/20/22  0455   TROPONINT 52* 57*       Imaging:  DX-CHEST-PORTABLE (1 VIEW)   Final Result      Mild cardiac silhouette enlargement following sternotomy          X-Ray:  I have personally reviewed the images and compared with prior images.  EKG:  I have personally reviewed the images and compared with prior images.    Assessment/Plan:  Justification for Admission Status    * Acute hypoxemic respiratory failure (HCC)- (present on admission)  Assessment & Plan  Normally on room air, requiring 2 L nasal cannula SPO2 87 to 90% on room air on admission, per bedside RN report desaturated to 65% when found by EMS.  Chest x-ray with enlarged cardiac silhouette following sternotomy without acute cardiopulmonary disease  D-dimer negative  ABG ordered by ERP 7.40/40.3/96/25 which was on 2L NC  Check COVID RSV and influenza panel  Procalcitonin 0.23.  Troponin/BNP 52/341, will trend troponin.  Check TTE  Oxygen per guidelines, wean as able  RT consult, continuous pulse ox, incentive spirometry  DuoNebs as needed  PFT's show      Encephalopathy- (present on admission)  Assessment & Plan  Family reports that patient was a passenger in the backseat of a car in a long road trip when she became unresponsive prompting family to call 911  Reportedly blood glucose was high on scene, found to be hypoxic with SPO2 of 65 on room air  Patient is alert and oriented x 4 at this time  Suspect secondary to prolonged hypoxia  UDS     Type 2 diabetes mellitus with chronic kidney disease, with long-term current use of insulin (HCC)- (present on admission)  Assessment & Plan  Insulin sliding scale, diabetic diet, POC glucose checks    Transaminitis- (present on admission)  Assessment & Plan  ALT 67, AST 95, alk phos 150 on admission  No abdominal pain, nausea, or  vomiting  -Avoid hepatoxins, serial monitor liver and synthetic function   -Monitor for hepatic encephalopathy    Elevated troponin- (present on admission)  Assessment & Plan  Troponin 52 and 57 on admission  Patient denies chest pain  Suspect secondary to demand due to hypoxia  EKG on admission demonstrates sinus tachycardia, rate 111, with right bundle branch block and T wave inversion in lateral leads  -Aspirin 324x1  -Continue to trend troponin every 6 hours  -Cardiac echo ordered   -Telemetry monitoring      Legally blind- (present on admission)  Assessment & Plan  Secondary to uncontrolled diabetes    RODY (acute kidney injury) (HCC)- (present on admission)  Assessment & Plan  Suspecting Pre-renal etiology   Check U/a & CPK  Rule out post obstruction  IVF  Renal dose meds and avoid nephrotoxins  Monitor I&O's  Follow renal function        VTE prophylaxis: SCDs/TEDs and heparin ppx

## 2022-11-20 NOTE — ED NOTES
"Spoke with son over phone. Florentino Mike [phone]. He stated that they were traveling from [place] to [place], when they pulled over to get gas pt was unarouseable. States she was \"gugling\". Called EMS. Pt had taken a medication an hour prior to this occurring. Son was unable to recall what medication.    Pt has hx of T1DM, blindness, and previous polio.  "

## 2022-11-20 NOTE — PROGRESS NOTES
CC: syncope    HPI: Per dr. Ford  Brenda Mckeon is a 52 y.o. female with uncontrolled type 2 diabetes, CKD3a, legally blind 2/2 uncontrolled DM2, and obesity, s/p cholecystectomy, s/p open heart surgery as a child, who presented the hospital on 11/20/2022 via life flight from [place], [place] with altered mental status and hypoxia.     History was obtained from the patient and the patient's daughter.  Patient's daughter reports that they were on a long road trip from [place] to [place] when the patient became unresponsive in the backseat of the car prompting family to call 911.  Upon arrival of EMS, the patient was found to be hypoxic with SPO2 of 65% on room air.  The patient was transported via [facility] to [facility].  Patient denies chest pain, shortness of breath, cough, fever, palpitations, nausea or vomiting, or dysuria.  She denies recent sick contacts or recent illness.  She states that she was in her normal state of health prior to being transported to the hospital.      Work-up in the ER was significant for blood glucose of 400 with normal anion gap, bicarb of 27, and mildly elevated beta hydroxybutyrate of 0.4.  Other pertinent labs include leukocytosis of 14.6, BUN of 38 and creatinine of 1.55, AST 95, ALT 67, elevated alk phos of 150 and mildly elevated lactic acid 2.3.  Troponin elevated at 52 and 57.  D-dimer negative.  Urinalysis with elevated glucose and trace ketones.  UDS positive for barbiturates.     Interval note:   The patient is feeling a little bit better now.  She was just admitted by my colleague earlier today for syncope and hypoxic respiratory failure.  She is traveling from [place] to [place] for Thanksgiving weekend.    Assessment and plan  Syncope  Unclear etiology  Could be related to vasovagal versus arrhythmia versus PE  D-dimer is negative  But considering tachycardia and hypoxia to get CT PE study to rule out pulmonary embolism once RODY improves  Echocardiogram is pending    Acute  hypoxic respiratory failure  No history of underlying lung disease  The patient to have obesity  May have underlying pulmonary hypertension  Titrate O2 as needed  Covid, flu: negative  CT PE to rule out pulmonary embolism once RODY improves  Also watch her closely for possible aspiration    Leukocytosis  Likely reactive  Plan as above    RODY  Not sure what her underlying creatinine level is  Creatinine 1.5  On gentle IV fluid  Avoid nephrotoxic drugs  Follow CMP in the morning

## 2022-11-20 NOTE — ASSESSMENT & PLAN NOTE
Resolved  Patient's encephalopathy could be secondary to acute urinary retention, also could be secondary to acute hypoxic respiratory failure  CT PE performed 11/21/2022 negative  Family reports that patient was a passenger in the backseat of a car in a long road trip when she became unresponsive prompting family to call 911  Reportedly blood glucose was high on scene, found to be hypoxic with SPO2 of 65 on room air  Patient is alert and oriented x 4 at this time  Suspect secondary to prolonged hypoxia  UDS positive for barbiturates

## 2022-11-20 NOTE — ASSESSMENT & PLAN NOTE
Patient noted to have episode of desaturation to 85% on room air, 91% on 1 L of oxygen 11/21/22  CT PE performed, negative for pulmonary embolus, some atelectasis in the right dependent lung.  Ordered incentive spirometry  Normally on room air, requiring 2 L nasal cannula SPO2 87 to 90% on room air on admission, per bedside RN report desaturated to 65% when found by EMS.  Chest x-ray with enlarged cardiac silhouette following sternotomy without acute cardiopulmonary disease  D-dimer negative  ABG ordered by ERP 7.40/40.3/96/25 which was on 2L NC  Check COVID RSV and influenza panel  Procalcitonin 0.23.  Troponin/BNP 52/341, will trend troponin.  Check TTE  Oxygen per guidelines, wean as able  RT consult, continuous pulse ox, incentive spirometry  DuoNebs as needed  for home oxygen evaluation

## 2022-11-20 NOTE — ASSESSMENT & PLAN NOTE
resolved  Suspecting Pre-renal etiology   Check U/a & CPK  Rule out post obstruction  IVF  Renal dose meds and avoid nephrotoxins  Monitor I&O's  Follow renal function

## 2022-11-20 NOTE — ED NOTES
Pt refusing all medications and further testing at the moment. Son at bedside discussing the situation with pt.

## 2022-11-21 ENCOUNTER — APPOINTMENT (OUTPATIENT)
Dept: RADIOLOGY | Facility: MEDICAL CENTER | Age: 52
DRG: 637 | End: 2022-11-21
Attending: STUDENT IN AN ORGANIZED HEALTH CARE EDUCATION/TRAINING PROGRAM
Payer: COMMERCIAL

## 2022-11-21 PROBLEM — R33.8 ACUTE URINARY RETENTION: Status: ACTIVE | Noted: 2022-11-21

## 2022-11-21 LAB
ANION GAP SERPL CALC-SCNC: 9 MMOL/L (ref 7–16)
BUN SERPL-MCNC: 23 MG/DL (ref 8–22)
CALCIUM SERPL-MCNC: 8.2 MG/DL (ref 8.5–10.5)
CHLORIDE SERPL-SCNC: 105 MMOL/L (ref 96–112)
CO2 SERPL-SCNC: 26 MMOL/L (ref 20–33)
CREAT SERPL-MCNC: 1.07 MG/DL (ref 0.5–1.4)
ERYTHROCYTE [DISTWIDTH] IN BLOOD BY AUTOMATED COUNT: 43.7 FL (ref 35.9–50)
GFR SERPLBLD CREATININE-BSD FMLA CKD-EPI: 62 ML/MIN/1.73 M 2
GLUCOSE BLD STRIP.AUTO-MCNC: 110 MG/DL (ref 65–99)
GLUCOSE BLD STRIP.AUTO-MCNC: 178 MG/DL (ref 65–99)
GLUCOSE BLD STRIP.AUTO-MCNC: 203 MG/DL (ref 65–99)
GLUCOSE BLD STRIP.AUTO-MCNC: 254 MG/DL (ref 65–99)
GLUCOSE SERPL-MCNC: 86 MG/DL (ref 65–99)
HCT VFR BLD AUTO: 35.2 % (ref 37–47)
HGB BLD-MCNC: 11.2 G/DL (ref 12–16)
MCH RBC QN AUTO: 29.2 PG (ref 27–33)
MCHC RBC AUTO-ENTMCNC: 31.8 G/DL (ref 33.6–35)
MCV RBC AUTO: 91.7 FL (ref 81.4–97.8)
PLATELET # BLD AUTO: 266 K/UL (ref 164–446)
PMV BLD AUTO: 9.1 FL (ref 9–12.9)
POTASSIUM SERPL-SCNC: 3.9 MMOL/L (ref 3.6–5.5)
RBC # BLD AUTO: 3.84 M/UL (ref 4.2–5.4)
SODIUM SERPL-SCNC: 140 MMOL/L (ref 135–145)
WBC # BLD AUTO: 6.8 K/UL (ref 4.8–10.8)

## 2022-11-21 PROCEDURE — 80048 BASIC METABOLIC PNL TOTAL CA: CPT

## 2022-11-21 PROCEDURE — 700117 HCHG RX CONTRAST REV CODE 255: Performed by: STUDENT IN AN ORGANIZED HEALTH CARE EDUCATION/TRAINING PROGRAM

## 2022-11-21 PROCEDURE — 82962 GLUCOSE BLOOD TEST: CPT | Mod: 91

## 2022-11-21 PROCEDURE — 85027 COMPLETE CBC AUTOMATED: CPT

## 2022-11-21 PROCEDURE — 700102 HCHG RX REV CODE 250 W/ 637 OVERRIDE(OP)

## 2022-11-21 PROCEDURE — 700111 HCHG RX REV CODE 636 W/ 250 OVERRIDE (IP)

## 2022-11-21 PROCEDURE — A9270 NON-COVERED ITEM OR SERVICE: HCPCS

## 2022-11-21 PROCEDURE — 51798 US URINE CAPACITY MEASURE: CPT

## 2022-11-21 PROCEDURE — 770020 HCHG ROOM/CARE - TELE (206)

## 2022-11-21 PROCEDURE — 36415 COLL VENOUS BLD VENIPUNCTURE: CPT

## 2022-11-21 PROCEDURE — 99232 SBSQ HOSP IP/OBS MODERATE 35: CPT | Mod: GC | Performed by: HOSPITALIST

## 2022-11-21 PROCEDURE — 71275 CT ANGIOGRAPHY CHEST: CPT

## 2022-11-21 RX ADMIN — IOHEXOL 65 ML: 350 INJECTION, SOLUTION INTRAVENOUS at 14:33

## 2022-11-21 RX ADMIN — INSULIN HUMAN 1 UNITS: 100 INJECTION, SOLUTION PARENTERAL at 21:24

## 2022-11-21 RX ADMIN — OMEPRAZOLE 20 MG: 20 CAPSULE, DELAYED RELEASE ORAL at 05:49

## 2022-11-21 RX ADMIN — HEPARIN SODIUM 5000 UNITS: 5000 INJECTION, SOLUTION INTRAVENOUS; SUBCUTANEOUS at 05:50

## 2022-11-21 RX ADMIN — INSULIN HUMAN 2 UNITS: 100 INJECTION, SOLUTION PARENTERAL at 14:44

## 2022-11-21 RX ADMIN — SENNOSIDES AND DOCUSATE SODIUM 2 TABLET: 50; 8.6 TABLET ORAL at 05:45

## 2022-11-21 RX ADMIN — ASPIRIN 81 MG: 81 TABLET, COATED ORAL at 05:45

## 2022-11-21 RX ADMIN — INSULIN HUMAN 3 UNITS: 100 INJECTION, SOLUTION PARENTERAL at 18:34

## 2022-11-21 RX ADMIN — HEPARIN SODIUM 5000 UNITS: 5000 INJECTION, SOLUTION INTRAVENOUS; SUBCUTANEOUS at 21:27

## 2022-11-21 RX ADMIN — HEPARIN SODIUM 5000 UNITS: 5000 INJECTION, SOLUTION INTRAVENOUS; SUBCUTANEOUS at 14:44

## 2022-11-21 ASSESSMENT — ENCOUNTER SYMPTOMS
PALPITATIONS: 0
EYE DISCHARGE: 0
COUGH: 0
HEADACHES: 0
SHORTNESS OF BREATH: 0
MYALGIAS: 0
CHILLS: 0
NAUSEA: 0
FEVER: 0
DEPRESSION: 0
EYE PAIN: 0
SPUTUM PRODUCTION: 0
FOCAL WEAKNESS: 0
WHEEZING: 0
HALLUCINATIONS: 0
CLAUDICATION: 0
NECK PAIN: 0
ABDOMINAL PAIN: 0
PHOTOPHOBIA: 0
BRUISES/BLEEDS EASILY: 0
DIZZINESS: 0
SORE THROAT: 0
BLOOD IN STOOL: 0
SINUS PAIN: 0
BACK PAIN: 0
WEAKNESS: 0
VOMITING: 0

## 2022-11-21 ASSESSMENT — LIFESTYLE VARIABLES: SUBSTANCE_ABUSE: 0

## 2022-11-21 NOTE — CARE PLAN
The patient is Stable - Low risk of patient condition declining or worsening    Shift Goals  Clinical Goals: decrease 02 demand, monitor labs, vitals  Patient Goals: decrease 02 demand    Progress made toward(s) clinical / shift goals:        Problem: Knowledge Deficit - Standard  Goal: Patient and family/care givers will demonstrate understanding of plan of care, disease process/condition, diagnostic tests and medications  Outcome: Progressing     Problem: Psychosocial  Goal: Patient's level of anxiety will decrease  Outcome: Progressing     Problem: Respiratory  Goal: Patient will achieve/maintain optimum respiratory ventilation and gas exchange  Outcome: Progressing     Problem: Fluid Volume  Goal: Fluid volume balance will be maintained  Outcome: Progressing     Problem: Infection - Standard  Goal: Patient will remain free from infection  Outcome: Progressing

## 2022-11-21 NOTE — PROGRESS NOTES
4 Eyes Skin Assessment Completed by ARACELI Guerrier and ARACELI Biggs.    Head WDL  Ears WDL  Nose WDL  Mouth WDL  Neck WDL  Breast/Chest Scar  Shoulder Blades WDL  Spine WDL  (R) Arm/Elbow/Hand WDL  (L) Arm/Elbow/Hand WDL  Abdomen WDL  Groin WDL  Scrotum/Coccyx/Buttocks WDL  (R) Leg Swelling  (L) Leg Swelling  (R) Heel/Foot/Toe WDL  (L) Heel/Foot/Toe WDL          Devices In Places Tele Box, Blood Pressure Cuff, and Pulse Ox      Interventions In Place Pressure Redistribution Mattress    Possible Skin Injury No    Pictures Uploaded Into Epic No, needs to be completed  Wound Consult Placed N/A  RN Wound Prevention Protocol Ordered No

## 2022-11-21 NOTE — PROGRESS NOTES
Bedside report received from NOC RN. Assumed care of pt. Pt awake, laying in bed. A/Ox4, VSS. No concerns, complaints or distress. Pt educated to call before getting out of bed. POC reviewed and white board updated. Tele box on. SR 84 on the monitor. Call light in reach. Bed locked in lowest position with 2 upper bed rails up. Bed alarm on.

## 2022-11-21 NOTE — PROGRESS NOTES
Monitor summary:        Rhythm: SR   Rate: 77-82  Ectopy: n/a  Measurements: 17./.11/.41        12hr chart check

## 2022-11-21 NOTE — PROGRESS NOTES
[facility] Internal Medicine Daily Progress Note    Date of Service  11/21/2022    [facility] Team: [facility] IM White Team   Attending: Kina Navarrete M.d.  Senior Resident: Dr. Corado  Intern:  Dr. Kelley  Contact Number: [phone]    Chief Complaint  Brenda Mckeon is a 52 y.o. female admitted 11/20/2022 with   altered mental status and hypoxia.     Hospital Course  Brenda Mckeon is a 52 y.o. female with uncontrolled type 2 diabetes, CKD3a, legally blind 2/2 uncontrolled DM2, and obesity, s/p cholecystectomy, s/p open heart surgery as a child, who presented the hospital on 11/20/2022 via life flight from [place], [place] with altered mental status and hypoxia.     History was obtained from the patient and the patient's daughter.  Patient's daughter reports that they were on a long road trip from [place] to [place] when the patient became unresponsive in the backseat of the car prompting family to call 911.  Upon arrival of EMS, the patient was found to be hypoxic with SPO2 of 65% on room air.  The patient was transported via [facility] to [facility].  Patient denies chest pain, shortness of breath, cough, fever, palpitations, nausea or vomiting, or dysuria.  She denies recent sick contacts or recent illness.  She states that she was in her normal state of health prior to being transported to the hospital.      Work-up in the ER was significant for blood glucose of 400 with normal anion gap, bicarb of 27, and mildly elevated beta hydroxybutyrate of 0.4.  Other pertinent labs include leukocytosis of 14.6, BUN of 38 and creatinine of 1.55, AST 95, ALT 67, elevated alk phos of 150 and mildly elevated lactic acid 2.3.  Troponin elevated at 52 and 57.  D-dimer negative.  Urinalysis with elevated glucose and trace ketones.  UDS positive for barbiturates.  Patient was also noted to retaining urine in the ED, straight cath performed yielded 700 ml       Interval Problem Update    Patient noted to have episode of desaturation to 85% on room air, 91%  on 1 L of oxygen    CT PE performed, negative for pulmonary embolus, some atelectasis in the right dependent lung.    Patient denies dyspnea or chest pain, non- tachypneic and not in respiratory distress.     Noted 900 ml urinary output robyn 9 AM 11/21/22 via pure wick catheter, postvoid bladder scan 27 ml, will continue to monitor for acute urinary retention      I have discussed this patient's plan of care and discharge plan at IDT rounds today with Case Management, Nursing, Nursing leadership, and other members of the IDT team.    Consultants/Specialty  N.a    Code Status  DNAR/DNI    Disposition  Patient is not medically cleared for discharge.   Anticipate discharge to to home with close outpatient follow-up.  I have placed the appropriate orders for post-discharge needs.    Review of Systems  Review of Systems   Constitutional:  Negative for chills, fever and malaise/fatigue.   HENT:  Negative for congestion, nosebleeds, sinus pain and sore throat.    Eyes:  Negative for photophobia, pain and discharge.        Legally blind   Respiratory:  Negative for cough, sputum production, shortness of breath and wheezing.    Cardiovascular:  Negative for chest pain, palpitations, claudication and leg swelling.   Gastrointestinal:  Negative for abdominal pain, blood in stool, nausea and vomiting.   Genitourinary:  Negative for dysuria, frequency and urgency.   Musculoskeletal:  Negative for back pain, myalgias and neck pain.   Skin:  Negative for itching and rash.   Neurological:  Negative for dizziness, focal weakness, weakness and headaches.   Endo/Heme/Allergies:  Negative for environmental allergies. Does not bruise/bleed easily.   Psychiatric/Behavioral:  Negative for depression, hallucinations, substance abuse and suicidal ideas.         Night terrors       Physical Exam  Temp:  [36.4 °C (97.5 °F)-36.6 °C (97.9 °F)] 36.4 °C (97.5 °F)  Pulse:  [70-94] 94  Resp:  [16] 16  BP: (120-147)/(55-74) 147/74  SpO2:  [91 %-95 %]  93 %    Physical Exam  Constitutional:       General: She is not in acute distress.     Appearance: She is obese. She is not ill-appearing, toxic-appearing or diaphoretic.   Eyes:      Comments: Legally blind   Cardiovascular:      Rate and Rhythm: Normal rate and regular rhythm.      Pulses: Normal pulses.      Heart sounds: Murmur heard.   Pulmonary:      Effort: Pulmonary effort is normal. No respiratory distress.      Breath sounds: No stridor. No wheezing, rhonchi or rales.   Abdominal:      General: Abdomen is flat. There is no distension.      Palpations: Abdomen is soft.      Tenderness: There is no abdominal tenderness. There is no guarding or rebound.      Hernia: No hernia is present.   Musculoskeletal:      Cervical back: Neck supple.      Right lower leg: No edema.      Left lower leg: No edema.   Skin:     General: Skin is warm and dry.      Capillary Refill: Capillary refill takes less than 2 seconds.   Neurological:      General: No focal deficit present.      Mental Status: She is alert and oriented to person, place, and time. Mental status is at baseline.      Cranial Nerves: No cranial nerve deficit.      Sensory: No sensory deficit.      Motor: No weakness.   Psychiatric:         Mood and Affect: Mood normal.       Fluids    Intake/Output Summary (Last 24 hours) at 11/21/2022 1629  Last data filed at 11/21/2022 1526  Gross per 24 hour   Intake 600 ml   Output 3200 ml   Net -2600 ml       Laboratory  Recent Labs     11/20/22  0204 11/21/22  0244   WBC 14.6* 6.8   RBC 4.68 3.84*   HEMOGLOBIN 13.6 11.2*   HEMATOCRIT 42.3 35.2*   MCV 90.4 91.7   MCH 29.1 29.2   MCHC 32.2* 31.8*   RDW 43.2 43.7   PLATELETCT 332 266   MPV 9.2 9.1     Recent Labs     11/20/22  0204 11/21/22  0244   SODIUM 133* 140   POTASSIUM 5.3 3.9   CHLORIDE 94* 105   CO2 27 26   GLUCOSE 403* 86   BUN 30* 23*   CREATININE 1.55* 1.07   CALCIUM 9.4 8.2*                   Imaging  CT-CTA CHEST PULMONARY ARTERY W/ RECONS   Final Result       No pulmonary embolus. No acute abnormality in the chest.         EC-ECHOCARDIOGRAM COMPLETE W/ CONT   Final Result      EC-ECHOCARDIOGRAM COMPLETE W/O CONT         DX-CHEST-PORTABLE (1 VIEW)   Final Result      Mild cardiac silhouette enlargement following sternotomy           Assessment/Plan  Problem Representation:    * Acute hypoxemic respiratory failure (HCC)- (present on admission)  Assessment & Plan  Patient noted to have episode of desaturation to 85% on room air, 91% on 1 L of oxygen 11/21/22  CT PE performed, negative for pulmonary embolus, some atelectasis in the right dependent lung.  Ordered incentive spirometry  Normally on room air, requiring 2 L nasal cannula SPO2 87 to 90% on room air on admission, per bedside RN report desaturated to 65% when found by EMS.  Chest x-ray with enlarged cardiac silhouette following sternotomy without acute cardiopulmonary disease  D-dimer negative  ABG ordered by ERP 7.40/40.3/96/25 which was on 2L NC  Check COVID RSV and influenza panel  Procalcitonin 0.23.  Troponin/BNP 52/341, will trend troponin.  Check TTE  Oxygen per guidelines, wean as able  RT consult, continuous pulse ox, incentive spirometry  DuoNebs as needed  for home oxygen evaluation      Acute urinary retention- (present on admission)  Assessment & Plan  Acute urinary retention could also contribute to patient's encephalopathy  Not known to take any anticholinergic or any opioid pain reliever other than Tylenol, findings not consistent with ongoing infection such as UTI  Patient was found to be retaining urine, straight catheter at ED revealed 700ml  900  mL urinary output robyn 9 AM 11/21/22 via pure wick catheter, postvoid bladder scan 27 ml  For repeat postvoid bladder scan x hrs    Encephalopathy- (present on admission)  Assessment & Plan  Resolved  Patient's encephalopathy could be secondary to acute urinary retention, also could be secondary to acute hypoxic respiratory failure  CT PE performed  11/21/2022 negative  Family reports that patient was a passenger in the backseat of a car in a long road trip when she became unresponsive prompting family to call 911  Reportedly blood glucose was high on scene, found to be hypoxic with SPO2 of 65 on room air  Patient is alert and oriented x 4 at this time  Suspect secondary to prolonged hypoxia  UDS positive for barbiturates    Type 2 diabetes mellitus with chronic kidney disease, with long-term current use of insulin (HCC)- (present on admission)  Assessment & Plan  Insulin sliding scale, diabetic diet, POC glucose checks    Transaminitis- (present on admission)  Assessment & Plan  ALT 67, AST 95, alk phos 150 on admission  No abdominal pain, nausea, or vomiting  -Avoid hepatoxins, serial monitor liver and synthetic function   -Monitor for hepatic encephalopathy    Elevated troponin- (present on admission)  Assessment & Plan  Troponin 52 and 57 on admission  Patient denies chest pain  Suspect secondary to demand due to hypoxia  EKG on admission demonstrates sinus tachycardia, rate 111, with right bundle branch block and T wave inversion in lateral leads  -Aspirin 324x1  -Continue to trend troponin every 6 hours  -Cardiac echo ordered   -Telemetry monitoring      Legally blind- (present on admission)  Assessment & Plan  Secondary to uncontrolled diabetes    RODY (acute kidney injury) (HCC)- (present on admission)  Assessment & Plan  resolved  Suspecting Pre-renal etiology   Check U/a & CPK  Rule out post obstruction  IVF  Renal dose meds and avoid nephrotoxins  Monitor I&O's  Follow renal function         VTE prophylaxis: SCDs/TEDs    I have performed a physical exam and reviewed and updated ROS and Plan today (11/21/2022). In review of yesterday's note (11/20/2022), there are no changes except as documented above.

## 2022-11-21 NOTE — CARE PLAN
The patient is Stable - Low risk of patient condition declining or worsening    Shift Goals  Clinical Goals: decrease 02 demand, ambulate  Patient Goals: ambulate    Progress made toward(s) clinical / shift goals:    Problem: Knowledge Deficit - Standard  Goal: Patient and family/care givers will demonstrate understanding of plan of care, disease process/condition, diagnostic tests and medications  Outcome: Progressing     Problem: Fall Risk  Goal: Patient will remain free from falls  Outcome: Progressing     Problem: Psychosocial  Goal: Patient's level of anxiety will decrease  Outcome: Progressing     Problem: Hemodynamics  Goal: Patient's hemodynamics, fluid balance and neurologic status will be stable or improve  Outcome: Progressing     Problem: Respiratory  Goal: Patient will achieve/maintain optimum respiratory ventilation and gas exchange  Outcome: Progressing       Patient is not progressing towards the following goals:  N/A

## 2022-11-22 VITALS
WEIGHT: 160 LBS | SYSTOLIC BLOOD PRESSURE: 145 MMHG | HEART RATE: 91 BPM | BODY MASS INDEX: 27.31 KG/M2 | DIASTOLIC BLOOD PRESSURE: 81 MMHG | RESPIRATION RATE: 16 BRPM | HEIGHT: 64 IN | OXYGEN SATURATION: 90 % | TEMPERATURE: 97.9 F

## 2022-11-22 LAB
ALBUMIN SERPL BCP-MCNC: 3.4 G/DL (ref 3.2–4.9)
ALBUMIN/GLOB SERPL: 0.9 G/DL
ALP SERPL-CCNC: 109 U/L (ref 30–99)
ALT SERPL-CCNC: 29 U/L (ref 2–50)
ANION GAP SERPL CALC-SCNC: 10 MMOL/L (ref 7–16)
AST SERPL-CCNC: 23 U/L (ref 12–45)
BILIRUB SERPL-MCNC: 0.3 MG/DL (ref 0.1–1.5)
BUN SERPL-MCNC: 14 MG/DL (ref 8–22)
CALCIUM SERPL-MCNC: 8.8 MG/DL (ref 8.5–10.5)
CHLORIDE SERPL-SCNC: 105 MMOL/L (ref 96–112)
CO2 SERPL-SCNC: 25 MMOL/L (ref 20–33)
CREAT SERPL-MCNC: 0.93 MG/DL (ref 0.5–1.4)
ERYTHROCYTE [DISTWIDTH] IN BLOOD BY AUTOMATED COUNT: 42.5 FL (ref 35.9–50)
GFR SERPLBLD CREATININE-BSD FMLA CKD-EPI: 73 ML/MIN/1.73 M 2
GLOBULIN SER CALC-MCNC: 3.7 G/DL (ref 1.9–3.5)
GLUCOSE BLD STRIP.AUTO-MCNC: 201 MG/DL (ref 65–99)
GLUCOSE BLD STRIP.AUTO-MCNC: 89 MG/DL (ref 65–99)
GLUCOSE SERPL-MCNC: 87 MG/DL (ref 65–99)
HCT VFR BLD AUTO: 37.2 % (ref 37–47)
HGB BLD-MCNC: 11.8 G/DL (ref 12–16)
MCH RBC QN AUTO: 28.7 PG (ref 27–33)
MCHC RBC AUTO-ENTMCNC: 31.7 G/DL (ref 33.6–35)
MCV RBC AUTO: 90.5 FL (ref 81.4–97.8)
PLATELET # BLD AUTO: 287 K/UL (ref 164–446)
PMV BLD AUTO: 8.9 FL (ref 9–12.9)
POTASSIUM SERPL-SCNC: 4.1 MMOL/L (ref 3.6–5.5)
PROT SERPL-MCNC: 7.1 G/DL (ref 6–8.2)
RBC # BLD AUTO: 4.11 M/UL (ref 4.2–5.4)
SODIUM SERPL-SCNC: 140 MMOL/L (ref 135–145)
WBC # BLD AUTO: 9.6 K/UL (ref 4.8–10.8)

## 2022-11-22 PROCEDURE — 80053 COMPREHEN METABOLIC PANEL: CPT

## 2022-11-22 PROCEDURE — 36415 COLL VENOUS BLD VENIPUNCTURE: CPT

## 2022-11-22 PROCEDURE — 700111 HCHG RX REV CODE 636 W/ 250 OVERRIDE (IP)

## 2022-11-22 PROCEDURE — 82962 GLUCOSE BLOOD TEST: CPT

## 2022-11-22 PROCEDURE — A9270 NON-COVERED ITEM OR SERVICE: HCPCS

## 2022-11-22 PROCEDURE — 85027 COMPLETE CBC AUTOMATED: CPT

## 2022-11-22 PROCEDURE — 700102 HCHG RX REV CODE 250 W/ 637 OVERRIDE(OP)

## 2022-11-22 PROCEDURE — 99238 HOSP IP/OBS DSCHRG MGMT 30/<: CPT | Mod: GC | Performed by: HOSPITALIST

## 2022-11-22 RX ORDER — GABAPENTIN 600 MG/1
600 TABLET ORAL NIGHTLY PRN
Qty: 30 TABLET | Refills: 0 | Status: SHIPPED | OUTPATIENT
Start: 2022-11-22 | End: 2022-12-22

## 2022-11-22 RX ORDER — LABETALOL HYDROCHLORIDE 5 MG/ML
10 INJECTION, SOLUTION INTRAVENOUS EVERY 6 HOURS PRN
Status: DISCONTINUED | OUTPATIENT
Start: 2022-11-22 | End: 2022-11-22 | Stop reason: HOSPADM

## 2022-11-22 RX ADMIN — INSULIN HUMAN 2 UNITS: 100 INJECTION, SOLUTION PARENTERAL at 13:31

## 2022-11-22 RX ADMIN — ASPIRIN 81 MG: 81 TABLET, COATED ORAL at 05:30

## 2022-11-22 RX ADMIN — HEPARIN SODIUM 5000 UNITS: 5000 INJECTION, SOLUTION INTRAVENOUS; SUBCUTANEOUS at 05:29

## 2022-11-22 RX ADMIN — OMEPRAZOLE 20 MG: 20 CAPSULE, DELAYED RELEASE ORAL at 05:29

## 2022-11-22 ASSESSMENT — PAIN DESCRIPTION - PAIN TYPE: TYPE: ACUTE PAIN

## 2022-11-22 NOTE — DISCHARGE PLANNING
Received Choice form at 1001  Agency/Facility Name: [facility]  Referral sent per Choice form @ 1010    1140  Agency/Facility Name: [facility]  Outcome: DPA left v-mail in regards to ETA, DPA requested call back     1150  Agency/Facility Name: [facility]  Spoke To: Tod  Outcome: ETA for O2 delivery is 1430, DME company is waiting for insurance auth still. DME company to follow up once insurance auth has been completed

## 2022-11-22 NOTE — ASSESSMENT & PLAN NOTE
Acute urinary retention could also contribute to patient's encephalopathy  Not known to take any anticholinergic or any opioid pain reliever other than Tylenol, findings not consistent with ongoing infection such as UTI  Patient was found to be retaining urine, straight catheter at ED revealed 700ml  900  mL urinary output robyn 9 AM 11/21/22 via pure wick catheter, postvoid bladder scan 27 ml  For repeat postvoid bladder scan x hrs

## 2022-11-22 NOTE — CARE PLAN
Problem: Fall Risk  Goal: Patient will remain free from falls  Outcome: Progressing     Problem: Respiratory  Goal: Patient will achieve/maintain optimum respiratory ventilation and gas exchange  Outcome: Progressing     Problem: Urinary Elimination  Goal: Establish and maintain regular urinary output  Outcome: Progressing   The patient is Stable - Low risk of patient condition declining or worsening    Shift Goals  Clinical Goals: shower, monitor VS  Patient Goals: rest    Progress made toward(s) clinical / shift goals:  Patient was showered safely.

## 2022-11-22 NOTE — DISCHARGE PLANNING
Case Management Discharge Planning    Admission Date: 11/20/2022  GMLOS: 3.5  ALOS: 2    6-Clicks ADL Score: 23  6-Clicks Mobility Score: 20      Anticipated Discharge Dispo: Discharge Disposition: Discharged to home/self care (01)    DME Needed: Yes    DME Ordered: Yes    Action(s) Taken: OTHER    Escalations Completed: DME Company    Medically Clear: No-pending Attending clearance    Next Steps: f/u with pt and medical team to discuss dc needs and barriers.    Barriers to Discharge: Medical clearance and Oxygen Delivery    RNCM spoke to pt and son bedside to discuss discharge plan: they will not be going to [place] and will drive back to home in [place], [place]. RNCM noted [employer] has a branch in [place] and pt choiced for [employer] for oxygen if insurance contracted. RNCM PC to [employer] liaison to discuss plan. PT gave a copy of insurance cards; placed in media tab by DPA.   Referral to be sent to [employer] to run insurance.

## 2022-11-22 NOTE — DOCUMENTATION QUERY
"                                                                         [facility]                                                                       Query Response Note      PATIENT:               JANELLE SMITH  ACCT #:                  [id number]  MRN:                     [id number]  :                      1970  ADMIT DATE:       2022 1:59 AM  DISCH DATE:          RESPONDING  PROVIDER #:        [id number]           QUERY TEXT:    Encephalopathy is documented in the Medical Record. Please specify type.    NOTE:  If an appropriate response is not listed below, please respond with a new note.    Note: If you agree with a diagnosis listed above, please remember to include it in your concurrent daily documentation and onto the Discharge Summary.      The patient's Clinical Indicators include:  52 year old female admitted for acute respiratory failure.     Warren/Landon \"Acute urinary retention could also contribute to patient's encephalopathy\"  \"Patient's encephalopathy could be secondary to acute urinary retention, also could be secondary to acute hypoxic respiratory failure\"    Risk factors: Acute hypoxemic respiratory failure  Treatment: labs, CXR, ECHO, CTA, O2, DuoNeb, IS, continuous pulse ox    If you have any questions please contact:  Sarah Tovar RN [facility]  [email or web address]  Sarah Tovar [address] [employer]  Options provided:   -- Anoxic encephalopathy   -- Metabolic encephalopathy   -- Other explanation, please specify   -- Unable to determine      Query created by: Sarah Tovar on 2022 5:39 PM    RESPONSE TEXT:    Metabolic encephalopathy          Electronically signed by:  CLYDE DEL VALLE MD 2022 7:38 PM              "

## 2022-11-22 NOTE — PROGRESS NOTES
Report received from DAY shift RN, assumed care of pt. Pt A&Ox4. Plan of care discussed with pt, labs and chart reviewed. All needs met at this time. Tele box on. On 0.5L O2 via nasal canula. Call light within reach, bed locked and in lowest position. All fall precautions and hourly rounding in place.

## 2022-11-22 NOTE — DISCHARGE INSTRUCTIONS
We did an imaging of your lungs and there was no clot, continue on regular ambulation and exercise  Please do deep breathing exercises as instructed  Please take your usual medications and follow up with your PCP

## 2022-11-22 NOTE — PROGRESS NOTES
Monitor summary:        Rhythm: SR   Rate: 78-90        Ectopy: (r)PVC  Measurements: .18/.10/.40        12hr chart check

## 2022-11-22 NOTE — DISCHARGE SUMMARY
[facility] Discharge Summary    Attending: Kina Navarrete M.d.  Senior Resident: Dr. Corado  Intern:  Dr. Kelley  Contact Number: [phone]    CHIEF COMPLAINT ON ADMISSION  Chief Complaint   Patient presents with    ALOC       Reason for Admission  Acute encephalopathy, likely multifactorial secondary to acute urinary retention, dehydration, and hypoxemia    Admission Date  11/20/2022    CODE STATUS  DNAR/DNI    HPI & HOSPITAL COURSE  Brenda Mckeon is a 52 y.o. female with a past medical history significant for type 1 diabetes (recent diagnosis per patient), CKD 3A, obesity, cholecystectomy, open heart surgery as a child, polio with residual leg weakness, and legal blindness who presented to the hospital via [facility] on 11/20/2022 with altered mental status and hypoxemia.  The patient had been on a road trip from [place] to [place] when family noted her to be minimally responsive in the backseat of their car and dialed 911.  EMS on scene noted her blood sugars to be 400s and SpO2 of 65% on room air.  Admitted for workup of encephalopathy.    #Acute encephalopathy  #Acute urinary retention  Likely multifactorial secondary to acute urinary retention, dehydration, and hypoxemia.  Resolved after fluid bolus and straight catheterization yielding 700 mL.  Neuro exam was nonfocal.  UDS +barbiturates, although unclear source based on reported and documented home meds.  Per son who was at bedside, patient was at her baseline mental status the day of discharge.  No longer retaining on serial bladder scans.    #Acute hypoxemic respiratory failure  Likely secondary to atelectasis (seen on CT).  CT PE negative for PE with otherwise normal appearing lung parenchyma.  No prior history of lung disease.  Non-smoker.  Unremarkable blood gas.  Echo with preserved EF, valvular function, and chamber sizes.  Discharged home with minimal supplemental oxygen (1 L at rest, 1 L on exertion).  Sent home with incentive  spirometer and encouraged her to stay active.  Follow-up with PCP to reassess oxygen need.  Consider sleep study given body habitus.    #Type 1 diabetes with hyperglycemia  #Diabetic neuropathy  Per patient, PCP told her she was a type 1 diabetic recently.  Blood sugars initially 400s, normal anion gap and barely elevated but hydroxybutyrate.  Improved after fluid bolus and resumption of home insulin.  Needs outpatient follow-up to further titrate insulin.  Home gabapentin reduced to once daily as needed to reflect reported home use.    #RODY on CKD  Prerenal versus postrenal.  Serum creatinine 1.55 on arrival.  Improved to 0.93 after fluid bolus and straight cath.    #Type 2 MI  In setting of RODY on CKD and acute illness.  Serial troponins and EKG nonischemic.  Denied chest pain.      Therefore, she is discharged in good and stable condition to home with close outpatient follow-up.    The patient met 2-midnight criteria for an inpatient stay at the time of discharge.    Discharge Date  11/22/22    Physical Exam on Day of Discharge  Physical Exam  Constitutional:       General: She is not in acute distress.  HENT:      Mouth/Throat:      Mouth: Mucous membranes are moist.      Pharynx: Oropharynx is clear.   Eyes:      Comments: Blindness   Cardiovascular:      Rate and Rhythm: Normal rate and regular rhythm.      Heart sounds: Murmur heard.   Pulmonary:      Effort: Pulmonary effort is normal. No respiratory distress.      Breath sounds: No wheezing or rales.   Abdominal:      Palpations: Abdomen is soft.      Tenderness: There is no abdominal tenderness.   Musculoskeletal:      Right lower leg: No edema.      Left lower leg: No edema.   Skin:     General: Skin is warm and dry.      Comments: Mild stasis dermatitis rash to mid shin bilaterally   Neurological:      Mental Status: She is alert and oriented to person, place, and time. Mental status is at baseline.      Cranial Nerves: No cranial nerve deficit.       Coordination: Coordination normal.      Gait: Gait abnormal (Walks with limp at baseline (polio)).      Comments: At baseline per son who was at bedside   Psychiatric:         Behavior: Behavior normal.         Thought Content: Thought content normal.       FOLLOW UP ITEMS POST DISCHARGE  Follow-up with PCP to reassess oxygen need.  Consider sleep study given body habitus.  Needs outpatient follow-up to manage diabetes.  Home gabapentin reduced to once daily as needed to reflect reported home use.    DISCHARGE DIAGNOSES  Principal Problem:    Acute hypoxemic respiratory failure (HCC) POA: Yes  Active Problems:    RODY (acute kidney injury) (HCC) POA: Yes    Legally blind (Chronic) POA: Yes    Elevated troponin POA: Yes    Transaminitis POA: Yes    Type 2 diabetes mellitus with chronic kidney disease, with long-term current use of insulin (HCC) (Chronic) POA: Yes    Encephalopathy POA: Yes    Acute urinary retention POA: Yes  Resolved Problems:    * No resolved hospital problems. *      FOLLOW UP  No future appointments.  Samson Colon  [address] [place] [address] [address]  [facility] [address]  [phone]    Schedule an appointment as soon as possible for a visit in 1 week(s)      MEDICATIONS ON DISCHARGE     Medication List        CHANGE how you take these medications        Instructions   gabapentin 600 MG tablet  What changed:   when to take this  reasons to take this  Commonly known as: NEURONTIN   Take 1 Tablet by mouth at bedtime as needed (neuropathic pain) for up to 30 days.  Dose: 600 mg            CONTINUE taking these medications        Instructions   insulin aspart 100 UNIT/ML Soln  Commonly known as: NovoLOG   Inject  under the skin 3 times a day before meals. PER SLIDING SCALE     insulin glargine 100 UNIT/ML Soln  Commonly known as: Lantus   Inject 24 Units under the skin every morning.  Dose: 24 Units     pantoprazole 40 MG Tbec  Commonly known as: PROTONIX   Take 1 Tablet by mouth every day.  Dose: 40 mg               Allergies  Allergies   Allergen Reactions    Humalog [Insulin Lispro, Human] Unspecified     Unknown    Valium Unspecified     Unknown       DIET  Orders Placed This Encounter   Procedures    Diet Order Diet: Consistent CHO (Diabetic)     Standing Status:   Standing     Number of Occurrences:   1     Order Specific Question:   Diet:     Answer:   Consistent CHO (Diabetic) [4]       ACTIVITY  As tolerated.  Weight bearing as tolerated    CONSULTATIONS  None    PROCEDURES  None    LABORATORY  Lab Results   Component Value Date    SODIUM 140 11/22/2022    POTASSIUM 4.1 11/22/2022    CHLORIDE 105 11/22/2022    CO2 25 11/22/2022    GLUCOSE 87 11/22/2022    BUN 14 11/22/2022    CREATININE 0.93 11/22/2022        Lab Results   Component Value Date    WBC 9.6 11/22/2022    HEMOGLOBIN 11.8 (L) 11/22/2022    HEMATOCRIT 37.2 11/22/2022    PLATELETCT 287 11/22/2022      TTE 11/20/2022:  CONCLUSIONS  Normal left ventricular size, wall thickness, and systolic function.   Normal diastolic function.  Normal right ventricular size and systolic function.  Normal left atrial size.  No significant valvular pathology.   No pericardial effusion.     Nor prior study for comparison.       Total time of the discharge process exceeds 46 minutes.

## 2022-11-22 NOTE — FACE TO FACE
"Face to Face Note  -  Durable Medical Equipment    Lian Kelley M.D. - NPI: [id number]  I certify that this patient is under my care and that they had a durable medical equipment(DME)face to face encounter by myself that meets the physician DME face-to-face encounter requirements with this patient on:    Date of encounter:   Patient:                    MRN:                       YOB: 2024  Brenda Mckeon  [id number]  1970     The encounter with the patient was in whole, or in part, for the following medical condition, which is the primary reason for durable medical equipment:  Other - hypoxic respiratory failure    I certify that, based on my findings, the following durable medical equipment is medically necessary:    Oxygen   HOME O2 Saturation Measurements:(Values must be present for Home Oxygen orders)  Room air sat at rest: 91  Room air sat with amb: 87  With liters of O2: 1, O2 sat at rest with O2: 96  With Liters of O2: 2, O2 sat with amb with O2 : 90  Is the patient mobile?: Yes  If patient feels more short of breath, they can go up to 6 liters per minute and contact healthcare provider.    Supporting Symptoms: The patient requires supplemental oxygen, as the following interventions have been tried with limited or no improvement: \"Bronchodilators and/or steroid inhalers, \"Ambulation with oximetry, and \"Incentive spirometry.    My Clinical findings support the need for the above equipment due to:  Hypoxia  "

## 2022-11-22 NOTE — HOSPITAL COURSE
Brenda Mckeon is a 52 y.o. female with uncontrolled type 2 diabetes, CKD3a, legally blind 2/2 uncontrolled DM2, and obesity, s/p cholecystectomy, s/p open heart surgery as a child, who presented the hospital on 11/20/2022 via life flight from [place], [place] with altered mental status and hypoxia.     History was obtained from the patient and the patient's daughter.  Patient's daughter reports that they were on a long road trip from [place] to [place] when the patient became unresponsive in the backseat of the car prompting family to call 911.  Upon arrival of EMS, the patient was found to be hypoxic with SPO2 of 65% on room air.  The patient was transported via [facility].  Patient denies chest pain, shortness of breath, cough, fever, palpitations, nausea or vomiting, or dysuria.  She denies recent sick contacts or recent illness.  She states that she was in her normal state of health prior to being transported to the hospital.      Work-up in the ER was significant for blood glucose of 400 with normal anion gap, bicarb of 27, and mildly elevated beta hydroxybutyrate of 0.4.  Other pertinent labs include leukocytosis of 14.6, BUN of 38 and creatinine of 1.55, AST 95, ALT 67, elevated alk phos of 150 and mildly elevated lactic acid 2.3.  Troponin elevated at 52 and 57.  D-dimer negative.  Urinalysis with elevated glucose and trace ketones.  UDS positive for barbiturates.  Patient was also noted to retaining urine in the ED, straight cath performed yielded 700 ml

## 2022-11-22 NOTE — FACE TO FACE
"Face to Face Note  -  Durable Medical Equipment    Lian Kelley M.D. - NPI: [id number]  I certify that this patient is under my care and that they had a durable medical equipment(DME)face to face encounter by myself that meets the physician DME face-to-face encounter requirements with this patient on:    Date of encounter:   Patient:                    MRN:                       YOB: 2024  Brenda Mckeon  [id number]  1970     The encounter with the patient was in whole, or in part, for the following medical condition, which is the primary reason for durable medical equipment:  Other - Hypoxic respiratory failure    I certify that, based on my findings, the following durable medical equipment is medically necessary:    Oxygen   HOME O2 Saturation Measurements:(Values must be present for Home Oxygen orders)  Room air sat at rest: 70  Room air sat with amb: 67  With liters of O2: 1, O2 sat at rest with O2: 98  With Liters of O2: 1, O2 sat with amb with O2 : 97  Is the patient mobile?: Yes  If patient feels more short of breath, they can go up to 6 liters per minute and contact healthcare provider.    Supporting Symptoms: The patient requires supplemental oxygen, as the following interventions have been tried with limited or no improvement: \"Bronchodilators and/or steroid inhalers, \"Ambulation with oximetry, and \"Incentive spirometry.    My Clinical findings support the need for the above equipment due to:  Hypoxia  "

## 2022-11-22 NOTE — CARE PLAN
The patient is Stable - Low risk of patient condition declining or worsening    Shift Goals  Clinical Goals: Discharge  Patient Goals: Discharge  Family Goals: Discharge    Progress made toward(s) clinical / shift goals:      Problem: Knowledge Deficit - Standard  Goal: Patient and family/care givers will demonstrate understanding of plan of care, disease process/condition, diagnostic tests and medications  Description: Target End Date:  1-3 days or as soon as patient condition allows    Document in Patient Education    1.  Patient and family/caregiver oriented to unit, equipment, visitation policy and means for communicating concern  2.  Complete/review Learning Assessment  3.  Assess knowledge level of disease process/condition, treatment plan, diagnostic tests and medications  4.  Explain disease process/condition, treatment plan, diagnostic tests and medications  Outcome: Met     Problem: Fall Risk  Goal: Patient will remain free from falls  Description: Target End Date:  Prior to discharge or change in level of care    Document interventions on the [facility] Fall Risk Assessment    1.  Assess for fall risk factors  2.  Implement fall precautions  Outcome: Met     Problem: Psychosocial  Goal: Patient's level of anxiety will decrease  Description: Target End Date:  1-3 days or as soon as patient condition allows    1.  Collaborate with patient and family/caregiver to identify triggers and develop strategies to cope with anxiety  2.  Implement stimuli reduction, calming techniques  3.  Pharmacologic management per provider order  4.  Encourage patient/family/care giver participation  5.  Collaborate with interdisciplinary team including Psychologist or Behavioral Health Team as needed  Outcome: Met  Goal: Patient's ability to verbalize feelings about condition will improve  Description: Target End Date:  Prior to discharge or change in level of care    1.  Discuss coping with medical condition and its  effects  2.  Encourage patient participation in care  3.  Encourage acknowledgement of body changes and accompanying emotions  4.  Perform depression screening  Outcome: Met  Goal: Patient's ability to re-evaluate and adapt role responsibilities will improve  Description: Target End Date:  Prior to discharge or change in level of care    1.  Assess family support  2.  Encourage support system participation in care  3.  Encouraged verbalization of feelings regarding caregiver responsibilities  4.  Discuss changes in role and responsibilities caused by patient's condition  Outcome: Met  Goal: Patient and family will demonstrate ability to cope with life altering diagnosis and/or procedure  Description: Target End Date:  1-3 days or as soon as patient condition allows    1. Expect initial shock and disbelief following diagnosis of cancer and traumatizing procedures (disfiguring surgery, colostomy, amputation).  2.  Assess patient and family/caregiver for stage of grief currently being experienced. Explain process as appropriate.  3.  Provide open, nonjudgmental environment. Use therapeutic communication skills of Active-Listening, acknowledgment, and so on.  4.  Encourage verbalization of thoughts or concerns and accept expressions of sadness, anger, rejection. Acknowledge normality of these feelings  5.  Be aware of mood swings, hostility, and other acting-out behavior. Set limits on inappropriate behavior, redirect negative thinking  6.  Be aware of debilitating depression. Ask patient direct questions about state of mind.  7.  Visit frequently and provide physical contact as appropriate, or provide frequent phone support as appropriate for setting. Arrange for care provider and support person to stay with patient as needed  8.  Reinforce teaching regarding disease process and treatments and provide information as appropriate about dying. Be honest; do not give false hope while providing emotional support  9.  Review  past life experiences, role changes, and coping skills. Talk about things that interest the patient  Outcome: Met  Goal: Spiritual and cultural needs incorporated into hospitalization  Description: Target End Date:  End of day 1    1.  Encourage verbalization of feelings, concerns, expectations and needs  2.  Collaborate with Case Management/  3.  Collaborate with Pastoral Care to meet spiritual needs  Outcome: Met     Problem: Communication  Goal: The ability to communicate needs accurately and effectively will improve  Description: Target End Date:  End of day 1    1.  Assess ability to communicate and understand  2.  Provide augmentative or alternative methods of communication devices  3.  Use /language line as appropriate  4.  Collaborate with Speech Therapy as needed  Outcome: Met     Problem: Discharge Barriers/Planning  Goal: Patient's continuum of care needs are met  Description: Target End Date:  Prior to discharge or change in level of care    1.  Identify potential discharge barriers on admission and throughout hospitalization  2.  Collaborate with Case Management, , Clinical Educators, Navigators and others on the transitional care team to meet discharge needs  3.  Involve patient/family/caregivers in setting and prioritizing goals for hospitalization and discharge  4.  Ensure Flu vaccinations are addressed  5.  Inquire if patient is interested in the Meds to Bed program  6.  Ensure patient and family/caregiver are able to demonstrate use of equipment as prescribed  7.  Ensure patient and family/caregiver can verbalize understanding of patient education  8.  Explain discharge instructions and medication reconciliation to patient and family/caregiver  Outcome: Met     Problem: Hemodynamics  Goal: Patient's hemodynamics, fluid balance and neurologic status will be stable or improve  Description: Target End Date:  Prior to discharge or change in level of  care    Document on Assessment and I/O flowsheet templates    1.  Monitor vital signs, pulse oximetry and cardiac monitor per provider order and/or policy  2.  Maintain blood pressure per provider order  3.  Hemodynamic monitoring per provider order  4.  Manage IV fluids and IV infusions  5.  Monitor intake and output  6.  Daily weights per unit policy or provider order  7.  Assess peripheral pulses and capillary refill  8.  Assess color and body temperature  9.  Position patient for maximum circulation/cardiac output  10. Monitor for signs/symptoms of excessive bleeding  11. Assess mental status, restlessness and changes in level of consciousness  12. Monitor temperature and report fever or hypothermia to provider immediately. Consideration of targeted temperature management.  Outcome: Met     Problem: Respiratory  Goal: Patient will achieve/maintain optimum respiratory ventilation and gas exchange  Description: Target End Date:  Prior to discharge or change in level of care    Document on Assessment flowsheet    1.  Assess and monitor rate, rhythm, depth and effort of respiration  2.  Breath sounds assessed qshift and/or as needed  3.  Assess O2 saturation, administer/titrate oxygen as ordered  4.  Position patient for maximum ventilatory efficiency  5.  Turn, cough, and deep breath with splinting to improve effectiveness  6.  Collaborate with RT to administer medication/treatments per order  7.  Encourage use of incentive spirometer and encourage patient to cough after use and utilize splinting techniques if applicable  8.  Airway suctioning  9.  Monitor sputum production for changes in color, consistency and frequency  10. Perform frequent oral hygiene  11. Alternate physical activity with rest periods  Outcome: Met     Problem: Chest Tube Management  Goal: Complications related to chest tube will be avoided or minimized  Description: Target End Date:  when tube discontinued    1.  Frequent respiratory  assessments  2.  Encourage cough and deep breathing exercises  3.  Encourage mobility and meals out of bed  4.  Monitor chest tube output (drainage amount/color)  5.  Assess chest tube connection sites to ensure tube is patent with no air leaks  6.  Ensure water-seal chamber is at correct level  7.  Ensure appropriate level of function (water-seal, -20 cm H20, etc...)  8.  Monitor for sign/symptoms of crepitus  Outcome: Met     Problem: Fluid Volume  Goal: Fluid volume balance will be maintained  Description: Target End Date:  Prior to discharge or change in level of care    Document on I/O flowsheet    1.  Monitor intake and output as ordered  2.  Promote oral intake as appropriate  3.  Report inadequate intake or output to physician  4.  Administer IV therapy as ordered  5.  Weights per provider order  6.  Assess for signs and symptoms of bleeding  7.  Monitor for signs of fluid overload (respiratory changes, edema, weight gain, increased abdominal girth)  8.  Monitor of signs for inadequate fluid volume (poor skin turgor, dry mucous membranes)  9.  Instruct patient on adherence to fluid restrictions  Outcome: Met     Problem: Mechanical Ventilation  Goal: Safe management of artificial airway and ventilation  Description: Target End Date:  when vent discontinued    Document on VAP flowsheet and Airway LDA    1.  Daily awakening trials per provider order/policy  2.  Suctioning and care of ET/Trach tube (document on LDA)  3.  Collaborate with RT to administer medications/treatments  4.  Ambu bag at bedside and available for transport  5.  Trach patient - replacement trach at bedside  6.  Provide communication tools if applicable  Outcome: Met  Goal: Successful weaning off mechanical ventilator, spontaneously maintains adequate gas exchange  Description: Target End Date:  when vent discontinued    1.  Follow universal weaning protocol for patients on mechanical ventilation per order  2.  Review contraindication list.   Obtain provider order to wean in presence of contraindication.  3.  Obtain Sixto Sedation-Agitation Score  4.  Sixto Score 1-2:  sedation vacation  5.  Sixto Score 3-4:  Collaborate with provider and/or RT to determine readiness for trial  6.  Begin 2 hour trial of weaning following protocol  7.  Evaluate for fatigue parameters per protocol  8.  Fatigue parameters triggered:  Stop wean and return to previous ASV% setting or increase % minute volume to offset work or breathing  Outcome: Met  Goal: Patient will be able to express needs and understand communication  Description: Target End Date:  when vent discontinued    1.  Assess ability to communicate and understand  2.  Provide communication tools  3.  Collaborate with Speech Therapy for [facility]  Outcome: Met     Problem: Dysphagia  Goal: Dysphagia will improve  Description: Target End Date:  Prior to discharge or change in level of care    1.  Assess and monitor ability to swallow  2.  Collaborate with Speech Therapy to determine appropriate adaptation for safe administration of medications and oral nutrition  3.  Elevate head of bed to 90 degrees during feedings and for 30 minutes after each feeding  4.  Encourage proper swallowing techniques  5.  Screening on admission or as soon as possible  Outcome: Met     Problem: Risk for Aspiration  Goal: Patient's risk for aspiration will be absent or decrease  Description: Target End Date:  Prior to discharge or change in level of care    1.   Complete dysphagia screening on admission  2.   NPO until dysphagia screening complete or medically cleared  3.   Collaborate with Speech Therapy, Clinical Dietitian and interdisciplinary team  4.   Implement aspiration precautions  5.   Assist patient up to chair for meals  6.   Elevate head of bed 90 degrees if patient is unable to get out of bed  7.   Encourage small bites  8.   Ensure foods/liquids are of appropriate consistency  9.   Assess for any signs/symptoms of  aspiration  10. Assess breath sounds and vital signs after oral intake  Outcome: Met     Problem: Nutrition  Goal: Patient's nutritional and fluid intake will be adequate or improve  Description: Target End Date:  Prior to discharge or change in level of care    Document on I/O flowsheet    1.  Monitor nutritional intake  2.  Monitor weight per provider order  3.  Assess patient's ability to take oral nutrition  4.  Collaborate with Speech Therapy, Dietitian and interdisciplinary team for appropriate feeding and fluid intake  5.  Assist with feeding  Outcome: Met  Goal: Enteral nutrition will be maintained or improve  Description: Target End Date:  Prior to discharge or change in level of care    1. Enteral access to be obtained or modified  2. Advance to goal rate per protocol  3. Collaborate with Clinical Dietitian for signs/symptoms of intolerance  4. Weights per provider order  5. Consider Speech Therapy consult for swallowing difficulties  Outcome: Met  Goal: Enteral nutrition will be maintained or improve  Description: Target End Date:  Prior to discharge or change in level of care    1.  Fingerstick glucose every 8 hours  2.  Notify provider for fever or elevated glucose  3.  TPN tubing and port changes every 24 hours.  Turn TPN through dedicated line. (Document on LDA)  4.  TPN dressing changes 24 hours after insertion and then every Saturday  (Document on LDA)  5.  Daily weights  6.  Monitor TPN lab results  7.  Collaborate with Clinical Dietician  8.  Collaborate with Pharmacist  Outcome: Met     Problem: Urinary Elimination  Goal: Establish and maintain regular urinary output  Description: Target End Date:  Prior to discharge or change in level of care    Document on I/O and Assessment flowsheets    1.  Evaluate need to continue indwelling catheter every shift  2.  Assess signs and symptoms of urinary retention  3.  Assess post-void residual volumes  4.  Implement bladder training program  5.  Encourage  scheduled voidings  6.  Assist patient to sit on bedside commode or toilet for voiding  7.  Educate patient and family/caregiver on use and purpose of urine collection devices (document in Patient Education)  Outcome: Met     Problem: Bowel Elimination  Goal: Establish and maintain regular bowel function  Description: Target End Date:  Prior to discharge or change in level of care    1.   Note date of last BM  2.   Educate about diet, fluid intake, medication and activity to promote bowel function  3.   Educate signs and symptoms of constipation and interventions to implement  4.   Pharmacologic bowel management per provider order  5.   Regular toileting schedule  6.   Upright position for toileting  7.   High fiber diet  8.   Encourage hydration  9.   Collaborate with Clinical Dietician  10. Care and maintenance of ostomy if applicable  Outcome: Met     Problem: Gastrointestinal Irritability  Goal: Nausea and vomiting will be absent or improve  Description: Target End Date:  Prior to discharge or change in level of care    Document on I/O and Assessment flowsheets    1.  Assess for history, duration, frequency, severity, and potential precipitating factors  2.  Administer antiemetics as ordered  3.  Emesis basin within easy reach of the patient, but out of sight if psychogenic component  4.  Eliminate strong odors from surroundings  5.  Introduce cold water, ice chips, marce products, and room temperature broth or bouillon if tolerated and appropriate to the patient's diet  6.  Encourage small amounts of bland, simple foods like broth, rice, bananas, Jell-O, crackers or toast if tolerated and appropriate to patient's diet  7.  Position the patient upright while eating and for 1 to 2 hours post-meal  8.  Encourage nonpharmacological nausea control techniques such as relaxation, guided imagery, music therapy, distraction, or deep breathing exercises  Outcome: Met  Goal: Diarrhea will be absent or  improved  Description: Target End Date:  Prior to discharge or change in level of care    1.  Assess for abdominal discomfort, pain, cramping, frequency, urgency, loose or liquid stools, and hyperactive bowel sensations.  2.  Evaluate pattern of defecation  3.  Administer antidiarrheal agents per order  4. Culture stool, per order  5.  Inquire about food intolerances, medications, change in eating pattern and current stressors  6.  Assess for fecal impaction  7.  Assess hydration status  8.  Assess condition of perianal skin  9.  Loss of bowel elimination control can lead of feelings of decreased self esteem.  Examine the emotional impact of illness, hospitalization and/or accidents.  Outcome: Met     Problem: Rectal Tube  Goal: Fecal output will be contained and skin will remain free from irritation  Description: Target End Date:  3 to 5 days    1.  Check tubing for leakage throughout shift, troubleshoot PRN per rectal tube order  2.  Flush blue irrigation tube once per shift and PRN with 60 mL of warm tap water  3.  Without removing tube from rectum, remove water from balloon and ensure less then or equal 45mL  4.  Ensure balloon is seeded to rectum by pulling slightly down on tubing  5.  Cleanse skin and apply barrier paste around tubing  Outcome: Met     Problem: Mobility  Goal: Patient's capacity to carry out activities will improve  Description: Target End Date:  Prior to discharge or change in level of care    1.  Assess for barriers to mobility/activity  2.  Implement activity per interdisciplinary team recommendations  3.  Target activity level identified and patient/family/caregiver aware of goal  4.  Provide assistive devices  5.  Instruct patient/caregiver on proper use of assistive/adaptive devices  6.  Schedule activities and rest periods to decrease effects of fatigue  7.  Encourage mobilization to extent of ability  8.  Maintain proper body alignment  9.  Provide adequate pain management to allow  progressive mobilization  10. Implement pace maker precautions as needed  Outcome: Met     Problem: Self Care  Goal: Patient will have the ability to perform ADLs independently or with assistance (bathe, groom, dress, toilet and feed)  Description: Target End Date:  Prior to discharge or change in level of care    Document on ADL flowsheet    1.  Assess the capability and level of deficiency to perform ADLs  2.  Encourage family/care giver involvement  3.  Provide assistive devices  4.  Consider PT/OT evaluations  5.  Maintain support, give positive feedback, encourage self-care allowing extra time and verbal cuing as needed  6.  Avoid doing something for patients they can do themselves, but provide assistance as needed  7.  Assist in anticipating/planning individual needs  8.  Collaborate with Case Management and  to meet discharge needs  Outcome: Met     Problem: Infection - Standard  Goal: Patient will remain free from infection  Description: Target End Date:  Prior to discharge or change in level of care    1.  Utilize Standard Precautions at all times to reduce the risk of transmission of microorganisms from both recognized and  unrecognized sources of infection  2.  Infection prevention handouts provided (general/device/diagnosis specific) and documented in Patient Education  3.  Educate patient and family/caregiver on isolation precautions if applicable  Outcome: Met     Problem: Wound/ / Incision Healing  Goal: Patient's wound/surgical incision will decrease in size and heals properly  Description: Target End Date:  Prior to discharge or change in level of care    Document on LDA    1.  Assess and document surgical incision/wound  2.  Provide incision/wound care per policy and/or provider orders  3.  Manage surgical drains per policy if applicable  4.  Encourage adequate nutrition to promote wound healing  5.  Collaborate with Clinical Dietician  Outcome: Met

## 2022-11-22 NOTE — PROGRESS NOTES
Pt IV pulled out for discharge. Education packet discussed with family and pt. All questions answered. Oxygen delivered to room. Pt was escorted off unit in wheelchair with family at side and nursing staff.

## 2025-03-12 NOTE — CARE PLAN
The patient is Stable - Low risk of patient condition declining or worsening    Shift Goals  Clinical Goals: decrease 02 demand, monitor labs, vitals  Patient Goals: decrease 02 demand    Progress made toward(s) clinical / shift goals:     Problem: Psychosocial  Goal: Patient's level of anxiety will decrease  11/20/2022 1644 by Chey Boone R.NLisandra  Outcome: Progressing  11/20/2022 1633 by Chey Boone RLisandraN.  Outcome: Progressing     Problem: Communication  Goal: The ability to communicate needs accurately and effectively will improve  Outcome: Progressing     Problem: Respiratory  Goal: Patient will achieve/maintain optimum respiratory ventilation and gas exchange  Outcome: Progressing     Problem: Fluid Volume  Goal: Fluid volume balance will be maintained  Outcome: Progressing       Patient is not progressing towards the following goals:  N/a     no